# Patient Record
Sex: MALE | Race: WHITE | NOT HISPANIC OR LATINO
[De-identification: names, ages, dates, MRNs, and addresses within clinical notes are randomized per-mention and may not be internally consistent; named-entity substitution may affect disease eponyms.]

---

## 2018-11-09 PROBLEM — Z00.00 ENCOUNTER FOR PREVENTIVE HEALTH EXAMINATION: Status: ACTIVE | Noted: 2018-11-09

## 2018-11-28 ENCOUNTER — APPOINTMENT (OUTPATIENT)
Dept: PULMONOLOGY | Facility: CLINIC | Age: 63
End: 2018-11-28

## 2018-11-28 ENCOUNTER — APPOINTMENT (OUTPATIENT)
Dept: PODIATRY | Facility: CLINIC | Age: 63
End: 2018-11-28
Payer: MEDICARE

## 2018-11-28 VITALS
WEIGHT: 235 LBS | SYSTOLIC BLOOD PRESSURE: 130 MMHG | HEIGHT: 70 IN | DIASTOLIC BLOOD PRESSURE: 70 MMHG | BODY MASS INDEX: 33.64 KG/M2

## 2018-11-28 DIAGNOSIS — F17.200 NICOTINE DEPENDENCE, UNSPECIFIED, UNCOMPLICATED: ICD-10-CM

## 2018-11-28 PROCEDURE — 11057 PARNG/CUTG B9 HYPRKR LES >4: CPT | Mod: Q8

## 2018-11-28 PROCEDURE — 11721 DEBRIDE NAIL 6 OR MORE: CPT | Mod: 59

## 2018-11-28 RX ORDER — CLOPIDOGREL BISULFATE 75 MG/1
75 TABLET, FILM COATED ORAL
Refills: 0 | Status: ACTIVE | COMMUNITY

## 2018-11-28 RX ORDER — ASPIRIN ENTERIC COATED TABLETS 81 MG 81 MG/1
81 TABLET, DELAYED RELEASE ORAL
Refills: 0 | Status: ACTIVE | COMMUNITY

## 2018-11-28 RX ORDER — ISOSORBIDE MONONITRATE 30 MG/1
30 TABLET, EXTENDED RELEASE ORAL
Refills: 0 | Status: ACTIVE | COMMUNITY

## 2019-02-18 ENCOUNTER — APPOINTMENT (OUTPATIENT)
Dept: PODIATRY | Facility: CLINIC | Age: 64
End: 2019-02-18
Payer: MEDICARE

## 2019-02-18 VITALS
DIASTOLIC BLOOD PRESSURE: 78 MMHG | SYSTOLIC BLOOD PRESSURE: 130 MMHG | BODY MASS INDEX: 33.64 KG/M2 | WEIGHT: 235 LBS | HEIGHT: 70 IN

## 2019-02-18 PROCEDURE — 99212 OFFICE O/P EST SF 10 MIN: CPT | Mod: 25

## 2019-02-18 PROCEDURE — 11055 PARING/CUTG B9 HYPRKER LES 1: CPT | Mod: Q8

## 2019-02-18 PROCEDURE — 11721 DEBRIDE NAIL 6 OR MORE: CPT | Mod: 59

## 2019-02-18 NOTE — HISTORY OF PRESENT ILLNESS
[FreeTextEntry1] : patient presents for longstanding chronic care of both feet. He has long thick and painful nails both feet, thickened and painful callus sub 5 right 2/2 1st ray resection right causing unstable gait. Patient requires periodic professional care and gets good relief.

## 2019-02-18 NOTE — PHYSICAL EXAM
[Right Foot Was Examined] : The right foot was examined [Left Foot Was Examined] : The left foot was examined [Delayed in the Right Toes] : delayed in the toes [Delayed in the Left Toes] : delayed in the toes [0] : 0 in the posterior tibialis [1+] : 1+ in the dorsalis pedis [Vibration Dec.] : diminished vibratory sensation at the level of the toes [Position Sense Dec.] : diminished position sense at the level of the toes [Diminished Throughout Right Foot] : diminished tactile sensation with monofilament testing throughout right foot [Diminished Throughout Left Foot] : diminished tactile sensation with monofilament testing throughout left foot [Nails Ingrowing Foot Bilateral] : ingrown [___] : [unfilled] [Nails Ingrowing Foot Left First Toe] : ingrown [Tenderness On Palpation First Toe Left Nail Bed] : tender [Swelling] : not swollen [Tenderness] : not tender [Erythema] : not erythematous [FreeTextEntry1] : Deep Tyloma beneath the fifth metatarsal head right foot [FreeTextEntry2] : s/p 1st ray resection right [] : not elongated [Toenails Thickening] : not hypertrophied [Toenails Discoloration] : normal colored [Nails Ingrowing Foot Right First Toe] : not ingrown [Nails Subungual Debris] : without subungal debris [Tenderness On Palpation First Toe Right Nail Bed] : nontender

## 2019-02-18 NOTE — PROCEDURE
[FreeTextEntry1] : Pulses and associated findings were examined at prior visits and vascular exam reveals evidence of PAD, It has been determined that the patient qualifies for at risk foot care, Patient presents for follow up care of long thick and painful nails to both feet which cause pain, especially in shoe gear and upon ambulation, The patient has been advised to seek professional foot care in the presence of PAD.  \par Mycotic nails all 9 nails exhibit characteristics of onychomycosis including but not limited to thickening incurvation subungual debris partial onycholysis and they are brittle and crumbly when cut and the patient complains of pain in the corners of several nails given they're incurvated status.\par          Hyperkeratosis noted as follows: sub 5 right\par         Signs of abuse/neglect? no.  \par         Fall risk? no.  \par         Skin Exam overall evaluation of the skin fails to reveal any breaks in the skin, erythema, ulcerations, web spaces are clear there is no sign of subcutaneous nodules skin lesions ulcers or other sign of infectious process, dry, peeling and cracked skin to the bottom of both feet.  \par \par \par Using sterile instrumentation debridement of all nails manually and electrically to decrease thickness, pain and girth and make shoe gear more comfortable with "slant back" procedure of any bordering spicules causing pain\par \par Shaving and padding of a benign hyperkeratotic lesion\par

## 2019-04-02 ENCOUNTER — APPOINTMENT (OUTPATIENT)
Dept: PODIATRY | Facility: CLINIC | Age: 64
End: 2019-04-02
Payer: MEDICARE

## 2019-04-02 VITALS
BODY MASS INDEX: 33.5 KG/M2 | HEIGHT: 70 IN | WEIGHT: 234 LBS | DIASTOLIC BLOOD PRESSURE: 80 MMHG | SYSTOLIC BLOOD PRESSURE: 140 MMHG

## 2019-04-02 DIAGNOSIS — L84 CORNS AND CALLOSITIES: ICD-10-CM

## 2019-04-02 PROCEDURE — 99213 OFFICE O/P EST LOW 20 MIN: CPT | Mod: 25

## 2019-04-02 PROCEDURE — 11055 PARING/CUTG B9 HYPRKER LES 1: CPT | Mod: Q8

## 2019-04-02 NOTE — PHYSICAL EXAM
[General Appearance - Alert] : alert [General Appearance - In No Acute Distress] : in no acute distress [FreeTextEntry1] : diabetic polyneuropathy [Oriented To Time, Place, And Person] : oriented to person, place, and time [Impaired Insight] : insight and judgment were intact [Affect] : the affect was normal

## 2019-04-02 NOTE — HISTORY OF PRESENT ILLNESS
[FreeTextEntry1] : Location: plantar/plantar lateral aspect of the right foot\par Duration: about 2 weeks\par Acute: yes\par Patient started experiencing a painful sensation to the bottom outside of the right foot that raised a red flag to the patient that he does suffer from diabetic polyneuropathy

## 2019-04-02 NOTE — PROCEDURE
[FreeTextEntry1] : After debridement of a significant hyperkeratotic lesion to the bottom of the right foot and evacuation of dried blood there was no significant ulcer \par A lengthy discussion with the patient regarding the diagnosis etiology conservative treatment as well as surgical treatments for the diagnosis. I advised the patient that conservative therapy is always preferred to surgical intervention however when conservative therapy fails it is time to consider surgical intervention. I explained how this current condition is treated surgically. I explained anticipated surgical procedure, anticipated postoperative course, the need for strict postoperative compliance regarding dressing changes, any assistive devices needed postoperatively and the importance of coming for scheduled surgical visits afterwards. Risks alternatives and benefits to surgical procedures were explained to the patient in great detail. I used bone models barefoot diagrams as well as and erasable board to explain etiology and treatment options should surgery be the ultimate decision. Again the risks and benefits reviewed all questions asked and answered appropriately\par

## 2019-04-09 ENCOUNTER — APPOINTMENT (OUTPATIENT)
Dept: PODIATRY | Facility: CLINIC | Age: 64
End: 2019-04-09
Payer: MEDICARE

## 2019-04-09 VITALS
BODY MASS INDEX: 33.5 KG/M2 | HEIGHT: 70 IN | WEIGHT: 234 LBS | SYSTOLIC BLOOD PRESSURE: 124 MMHG | DIASTOLIC BLOOD PRESSURE: 80 MMHG

## 2019-04-09 PROCEDURE — 99213 OFFICE O/P EST LOW 20 MIN: CPT

## 2019-04-09 RX ORDER — LINAGLIPTIN AND METFORMIN HYDROCHLORIDE 2.5; 1 MG/1; MG/1
2.5-1 TABLET, FILM COATED ORAL
Refills: 0 | Status: DISCONTINUED | COMMUNITY
End: 2019-04-09

## 2019-04-09 NOTE — PROCEDURE
[FreeTextEntry1] : I have reviewed the care orders with the patient they understand and they also understands the long-term consequences of not following my wound care orders as well  precautions I have recommended  and shoe gear advice I have mentioned. Betadine daily for 5 days

## 2019-04-09 NOTE — HISTORY OF PRESENT ILLNESS
[FreeTextEntry1] : Location: plantar/plantar lateral aspect of the right foot\par Duration: about 3 weeks\par Acute: yes\par Has been offea with a diabetic surgically shoe as well as using Bactroban daily

## 2019-04-09 NOTE — PHYSICAL EXAM
[General Appearance - In No Acute Distress] : in no acute distress [General Appearance - Alert] : alert [FreeTextEntry1] : peripheral neuropathy

## 2019-04-30 ENCOUNTER — APPOINTMENT (OUTPATIENT)
Dept: PODIATRY | Facility: CLINIC | Age: 64
End: 2019-04-30
Payer: MEDICARE

## 2019-04-30 VITALS
BODY MASS INDEX: 33.5 KG/M2 | WEIGHT: 234 LBS | SYSTOLIC BLOOD PRESSURE: 126 MMHG | HEIGHT: 70 IN | DIASTOLIC BLOOD PRESSURE: 70 MMHG

## 2019-04-30 PROCEDURE — 11721 DEBRIDE NAIL 6 OR MORE: CPT | Mod: 59

## 2019-04-30 PROCEDURE — 99212 OFFICE O/P EST SF 10 MIN: CPT

## 2019-04-30 PROCEDURE — 11056 PARNG/CUTG B9 HYPRKR LES 2-4: CPT | Mod: Q8

## 2019-04-30 RX ORDER — CEPHALEXIN 500 MG/1
500 CAPSULE ORAL 3 TIMES DAILY
Qty: 21 | Refills: 1 | Status: DISCONTINUED | COMMUNITY
Start: 2019-04-03 | End: 2019-04-30

## 2019-04-30 NOTE — PHYSICAL EXAM
[Right Foot Was Examined] : The right foot was examined [Left Foot Was Examined] : The left foot was examined [Delayed in the Right Toes] : delayed in the toes [Delayed in the Left Toes] : delayed in the toes [0] : 0 in the posterior tibialis [1+] : 1+ in the dorsalis pedis [Vibration Dec.] : diminished vibratory sensation at the level of the toes [Position Sense Dec.] : diminished position sense at the level of the toes [Diminished Throughout Right Foot] : diminished tactile sensation with monofilament testing throughout right foot [Diminished Throughout Left Foot] : diminished tactile sensation with monofilament testing throughout left foot [Swelling] : not swollen [Tenderness] : not tender [Erythema] : not erythematous [FreeTextEntry1] : The patient has all contributing factors to onychomycosis including but not limited to thickness, subungual debris, discoloration and partial lysis and they are brittle when cut.  Painful hyperkeratotic lesions noted sub 5 bilateral, no evidcence of any break in the skin, or ulcerative lesion [FreeTextEntry2] : s/p 1st ray resection right

## 2019-04-30 NOTE — PROCEDURE
[FreeTextEntry1] : The above-named patient presents for followup of diabetic foot care. The patient complains of ongoing thickened and painful nails and states they get relief from periodic debridement under sterile aseptic technique. The patient has no other presenting complaints. Therefore, under sharp sterile aseptic technique sharp debridement of all nails was performed with removal of any offending painful spicules. This was accomplished in order to make the toes and feet more comfortable to ambulate in conventional shoe gear. This was followed by electric burring of irregular and rough edges. In addition to the debridement of nails the patient had a lengthy discussion regarding overall skin care and discussion regarding the type of shoes they should be wearing. At the conclusion of the visit the patient stated they understood and all questions asked and answered appropriately and the patient stated relief of symptoms\par Using sterile instrumentation debridement of all nails manually and electrically to decrease thickness, pain and girth and make shoe gear more comfortable with "slant back" procedure of any bordering spicules causing pain\par Utilizing a scalpel and sterile aseptic technique sharp debridement of multiple hyperkeratotic lesions performed. Appropriate padding were necessary.\par

## 2019-05-20 ENCOUNTER — RESULT REVIEW (OUTPATIENT)
Age: 64
End: 2019-05-20

## 2019-05-20 ENCOUNTER — APPOINTMENT (OUTPATIENT)
Dept: RADIATION ONCOLOGY | Facility: CLINIC | Age: 64
End: 2019-05-20
Payer: MEDICARE

## 2019-05-20 ENCOUNTER — APPOINTMENT (OUTPATIENT)
Dept: HEMATOLOGY ONCOLOGY | Facility: CLINIC | Age: 64
End: 2019-05-20
Payer: MEDICARE

## 2019-05-20 VITALS
TEMPERATURE: 98 F | DIASTOLIC BLOOD PRESSURE: 73 MMHG | OXYGEN SATURATION: 100 % | RESPIRATION RATE: 20 BRPM | BODY MASS INDEX: 33.3 KG/M2 | SYSTOLIC BLOOD PRESSURE: 130 MMHG | WEIGHT: 230 LBS | HEART RATE: 60 BPM | HEIGHT: 69.69 IN

## 2019-05-20 VITALS
TEMPERATURE: 98.3 F | RESPIRATION RATE: 20 BRPM | HEART RATE: 62 BPM | BODY MASS INDEX: 34.51 KG/M2 | HEIGHT: 69 IN | WEIGHT: 233 LBS | OXYGEN SATURATION: 100 % | DIASTOLIC BLOOD PRESSURE: 70 MMHG | SYSTOLIC BLOOD PRESSURE: 129 MMHG

## 2019-05-20 PROCEDURE — 92511 NASOPHARYNGOSCOPY: CPT

## 2019-05-20 PROCEDURE — 99205 OFFICE O/P NEW HI 60 MIN: CPT | Mod: 25

## 2019-05-20 PROCEDURE — 99205 OFFICE O/P NEW HI 60 MIN: CPT

## 2019-05-20 NOTE — PHYSICAL EXAM
[Fully active, able to carry on all pre-disease performance without restriction] : Status 0 - Fully active, able to carry on all pre-disease performance without restriction [Normal] : affect appropriate [de-identified] : left posterior palate - induration together with left tonsilar infiltration, LEft cervical adenopathy

## 2019-05-20 NOTE — ASSESSMENT
[FreeTextEntry1] : 64 yo male referred by Dr. YING Kaur for evaluation of newly diagnosed tongue cancer.  \par Patient underwent endoscopy and CT scan of soft tissue neck which revealed left posterior aspect ulcerative lesion on the tongue confluent and infiltrating left tonsil. Patient has palpable left cervical adenopathy. \par \par We discussed current findings and indications for biopsy and staging with PETCT.  \par Patient will stop plavix and Asprin prior to biopsy with IR.\par He is a diabetic and has h/o peripheral neuropathy thus he is not a candidate for cisplatin.  Offered carbo/ 5- FU protocol: \par Days 1–4: 5-FU 600mg/m2/day as continuous IV infusion for 4 days + carboplatin 70mg/m2/day IV bolus.\par \par Repeat every 3 weeks for 3 cycles (given concurrently with radiotherapy).\par \par Will finalize the treatment plan after biopsy and staging. \par

## 2019-05-20 NOTE — CONSULT LETTER
[Dear  ___] : Dear  [unfilled], [Consult Letter:] : I had the pleasure of evaluating your patient, [unfilled]. [Please see my note below.] : Please see my note below. [Consult Closing:] : Thank you very much for allowing me to participate in the care of this patient.  If you have any questions, please do not hesitate to contact me. [Sincerely,] : Sincerely, [FreeTextEntry3] : Ana Velasquez MD\par [facility] [place] [facility]\par  [DrEvie  ___] : Dr. CHOWDARY [DrEvie ___] : Dr. CHOWDARY

## 2019-05-20 NOTE — HISTORY OF PRESENT ILLNESS
[de-identified] : 63 year old male presents today for new diagnosis of head and neck cancer, referred by Dr. Kaur, ENT.  Mr. Camacho was sent to ENT for a left neck mass with associated left tongue base.  He reported a 3 month history of constant, moderate progressive left sided throat pain with radiating otalgia.  Incidentally it was noted on neck US with vascular surgeon to have a left neck mass.  He has a long standing history of cigarette smoking 5- 6 cigarettes per day and uses alcohol in past however has quit x 5 years.  CT scan done 5/16/19 showed a 5.5cm multiloculated solid and cystic mass within the left latera; neck soft tissues.  Findings may reflect primary neoplasm versus conglomerate of left level 3 cystic/necrotic lymphadenopathy.  Additional abnormal left level 2-4 lymphadenopathy.  Nonspecific prominence of the left lingual tonsils with enhancing soft tissue involving the left oropharyngeal lateral wall.  Regional effacement of fat planes laterally involving the left submandibular gland and posteriorly involving the carotid sheath.  Mild asymmetric enlargement of the left submandibular gland.  PET CT is recommend.  He has not had a biopsy.  He has complaints of a sore throat, with left ear pain at times and new onset of headaches.  \par \par Patient was adopted so he does not know his family history

## 2019-05-21 NOTE — VITALS
[Maximal Pain Intensity: 0/10] : 0/10 [Least Pain Intensity: 0/10] : 0/10 [Date: ____________] : Patient's last distress assessment performed on [unfilled]. [7 - Distress Level] : Distress Level: 7 [Referred Patient  to social work for follow-up] : Patient was referred to social work for follow-up [Patient given social work contact information and resource sheet] : Patient was given social work contact information and resource sheet [FreeTextEntry7] : Transportation

## 2019-05-21 NOTE — LETTER CLOSING
[Consult Closing:] : Thank you for allowing me to participate in the care of this patient.  If you have any questions, please do not hesitate to contact me. [Sincerely yours,] : Sincerely yours, [FreeTextEntry3] : Ally Thapa MD

## 2019-05-21 NOTE — PROCEDURE
[Hoarseness] : hoarseness not clearly evaluated by indirect laryngoscopy [Topical Lidocaine] : topical lidocaine [Flexible Endoscope] : examined with the flexible endoscope [Photographs Taken] : photographs taken [Normal] : the true vocal cords ~T were normal [de-identified] : Area of fullness along the left base of tongue [de-identified] : Edematous

## 2019-05-21 NOTE — PHYSICAL EXAM
[Normal] : no focal deficits [de-identified] : left neck lymphadenopathy [de-identified] : able to plapate left cervical adenopathy and fullness

## 2019-05-21 NOTE — HISTORY OF PRESENT ILLNESS
[FreeTextEntry1] : Mr. Camacho is a 63 year old male, who was recent found to have a left tongue base mass with left neck lymphadenopathy referred by Dr. Kaur to discuss management options with radiation therapy.    \par \par On 4/25/19, while undergoing a carotid ultrasound he was found to have multiple large complex masses in the left neck (largest measuring 4.3 cm).  Dr. Gold then referred Mr. Camacho to ENT for further evaluation. Laryngoscopy was performed (5/7/19), which revealed a mucosal lesion in the pharynx and a possible left tongue mass. Over the past 2 months, he reports occasional cough, left sided sore throat, left otalgia and more recently headaches. He rates his pain/discomfort 7 /10 and prefers not to try any pain medication at this time. He is an active smoker (1 ppd x 50 years, currently down to 5 cigarettes/ day) and has a history alcohol abuse with sobriety for 5 years. CT Neck w/ contrast (5/16/19) demonstrated a 5.5 cm solid and cystic mass within the left lateral neck with abnormal left level 2 through 4 lymphadenopathy. Biopsy of the left neck adenopathy is pending scheduling and PET/CT is scheduled for 5/23/19. Mr. Camacho  presents today to discuss radiation therapy in the management of his diagnosis. \par

## 2019-05-21 NOTE — DISEASE MANAGEMENT
[Clinical] : TNM Stage: c [N/A] : Currently not applicable [FreeTextEntry4] : Oropharyngeal mass [TTNM] : X [NTNM] : X [MTNM] : X

## 2019-05-21 NOTE — PROCEDURE
[Hoarseness] : hoarseness not clearly evaluated by indirect laryngoscopy [Topical Lidocaine] : topical lidocaine [Flexible Endoscope] : examined with the flexible endoscope [Photographs Taken] : photographs taken [Normal] : the true vocal cords ~T were normal [de-identified] : Area of fullness along the left base of tongue [de-identified] : Edematous

## 2019-05-21 NOTE — PHYSICAL EXAM
[Normal] : no focal deficits [de-identified] : left neck lymphadenopathy [de-identified] : able to plapate left cervical adenopathy and fullness

## 2019-05-28 ENCOUNTER — RESULT REVIEW (OUTPATIENT)
Age: 64
End: 2019-05-28

## 2019-05-29 ENCOUNTER — RESULT REVIEW (OUTPATIENT)
Age: 64
End: 2019-05-29

## 2019-06-11 ENCOUNTER — RESULT REVIEW (OUTPATIENT)
Age: 64
End: 2019-06-11

## 2019-06-12 ENCOUNTER — RESULT REVIEW (OUTPATIENT)
Age: 64
End: 2019-06-12

## 2019-07-08 ENCOUNTER — APPOINTMENT (OUTPATIENT)
Dept: PODIATRY | Facility: CLINIC | Age: 64
End: 2019-07-08
Payer: MEDICARE

## 2019-07-08 VITALS
SYSTOLIC BLOOD PRESSURE: 124 MMHG | HEIGHT: 69 IN | WEIGHT: 233 LBS | BODY MASS INDEX: 34.51 KG/M2 | DIASTOLIC BLOOD PRESSURE: 80 MMHG

## 2019-07-08 PROCEDURE — 99213 OFFICE O/P EST LOW 20 MIN: CPT | Mod: 25

## 2019-07-08 PROCEDURE — 11042 DBRDMT SUBQ TIS 1ST 20SQCM/<: CPT | Mod: LT

## 2019-07-08 PROCEDURE — 11056 PARNG/CUTG B9 HYPRKR LES 2-4: CPT | Mod: Q8,59

## 2019-07-08 PROCEDURE — 11721 DEBRIDE NAIL 6 OR MORE: CPT | Mod: 59

## 2019-07-08 NOTE — REASON FOR VISIT
[Follow-Up] : a follow-up visit [FreeTextEntry1] : diabetic foot care and new c/o open sore 5th toe left foot

## 2019-07-08 NOTE — PROCEDURE
[FreeTextEntry1] : Using sterile instrumentation debridement of all nails manually and electrically to decrease thickness, pain and girth and make shoe gear more comfortable with "slant back" procedure of any bordering spicules causing pain\par Utilizing a scalpel and sterile aseptic technique sharp debridement of multiple hyperkeratotic lesions performed. Appropriate padding were necessary.\par \par Evaluation of the lesser left toe is 0.3 x 0.2 0.2 cm no sign of infection no erythema no discharge or drainage completely filled with fibrin\par a sharp full thickness excisional debridement utilizing a scalpel tissue nipper as well as a curette into the subcutaneous tissue level was performed. Bleeding was mild and was controlled with pressure. , discharge orders as well as wound care orders were reviewed with the patient and a follow up appointment given, Bactroban to be applied on a daily basis\par

## 2019-07-08 NOTE — HISTORY OF PRESENT ILLNESS
[FreeTextEntry1] : The patient presents for follow up of chronic and painful mycotic nail disease as well as painful kyperkeratoses. Past professional  tx's in the past in the presence of PAD have consisted of periodic debridements of both nails and painful keratoses which have offered significant relief in controlling of symptoms and the patient presents for follow up care.\par Through separate identifiable small laceration to the medial aspect of the left fifth toe history of present illness unknown etiology unknown no prior treatment rendered

## 2019-07-08 NOTE — PHYSICAL EXAM
[General Appearance - Alert] : alert [General Appearance - In No Acute Distress] : in no acute distress [Right Foot Was Examined] : The right foot was examined [Left Foot Was Examined] : The left foot was examined [Delayed in the Right Toes] : delayed in the toes [Delayed in the Left Toes] : delayed in the toes [0] : 0 in the posterior tibialis [Vibration Dec.] : diminished vibratory sensation at the level of the toes [1+] : 1+ in the dorsalis pedis [Position Sense Dec.] : diminished position sense at the level of the toes [Diminished Throughout Right Foot] : diminished tactile sensation with monofilament testing throughout right foot [Diminished Throughout Left Foot] : diminished tactile sensation with monofilament testing throughout left foot [Impaired Insight] : insight and judgment were intact [Oriented To Time, Place, And Person] : oriented to person, place, and time [Affect] : the affect was normal [Tenderness] : not tender [Swelling] : not swollen [Erythema] : not erythematous [FreeTextEntry2] : s/p 1st ray resection right [FreeTextEntry1] : diabetic neuropathy noted

## 2019-07-08 NOTE — REVIEW OF SYSTEMS
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

## 2019-07-16 ENCOUNTER — APPOINTMENT (OUTPATIENT)
Dept: PODIATRY | Facility: CLINIC | Age: 64
End: 2019-07-16
Payer: MEDICARE

## 2019-07-16 VITALS
SYSTOLIC BLOOD PRESSURE: 126 MMHG | HEIGHT: 69 IN | BODY MASS INDEX: 34.51 KG/M2 | DIASTOLIC BLOOD PRESSURE: 80 MMHG | WEIGHT: 233 LBS

## 2019-07-16 PROCEDURE — 99213 OFFICE O/P EST LOW 20 MIN: CPT

## 2019-07-16 NOTE — PHYSICAL EXAM
[General Appearance - Alert] : alert [General Appearance - In No Acute Distress] : in no acute distress [Right Foot Was Examined] : The right foot was examined [Left Foot Was Examined] : The left foot was examined [Delayed in the Right Toes] : delayed in the toes [Delayed in the Left Toes] : delayed in the toes [0] : 0 in the posterior tibialis [1+] : 1+ in the dorsalis pedis [Vibration Dec.] : diminished vibratory sensation at the level of the toes [Position Sense Dec.] : diminished position sense at the level of the toes [Diminished Throughout Right Foot] : diminished tactile sensation with monofilament testing throughout right foot [Diminished Throughout Left Foot] : diminished tactile sensation with monofilament testing throughout left foot [Oriented To Time, Place, And Person] : oriented to person, place, and time [Impaired Insight] : insight and judgment were intact [Affect] : the affect was normal [Swelling] : not swollen [Tenderness] : not tender [Erythema] : not erythematous [FreeTextEntry2] : s/p 1st ray resection right [FreeTextEntry1] : diabetic polyneuropathy

## 2019-07-16 NOTE — HISTORY OF PRESENT ILLNESS
[FreeTextEntry1] :  medial aspect of the left fifth toe history of present illness unknown etiology

## 2019-07-16 NOTE — PROCEDURE
[FreeTextEntry1] : I have had a lengthy discussion with the patient regarding overall skincare. The importance of the type of socks, the type of shoes, and the type of overall foot hygiene is important to help control and prevent eruptions especially over extreme weather changes. This included but was not limited to hydration and lubrication, dove soap, triple rinse clothing and linens. I also explained the importance of thorough drying of both feet especially the web spaces. Given the extreme temperatures back in a car I also reviewed the type of shoes that would help reduce the chances of cracking of the skin especially leading to fissuring of the heels. Overall skincare precautions were reviewed education literature dispensed in the patient's questions asked and answered appropriately.\par

## 2019-08-05 ENCOUNTER — APPOINTMENT (OUTPATIENT)
Dept: HEMATOLOGY ONCOLOGY | Facility: CLINIC | Age: 64
End: 2019-08-05

## 2019-08-20 ENCOUNTER — APPOINTMENT (OUTPATIENT)
Dept: PODIATRY | Facility: CLINIC | Age: 64
End: 2019-08-20
Payer: MEDICARE

## 2019-08-20 VITALS
BODY MASS INDEX: 34.51 KG/M2 | WEIGHT: 233 LBS | DIASTOLIC BLOOD PRESSURE: 70 MMHG | HEIGHT: 69 IN | SYSTOLIC BLOOD PRESSURE: 120 MMHG

## 2019-08-20 PROCEDURE — 11056 PARNG/CUTG B9 HYPRKR LES 2-4: CPT | Mod: Q8

## 2019-08-20 PROCEDURE — 11721 DEBRIDE NAIL 6 OR MORE: CPT | Mod: 59

## 2019-08-20 NOTE — PROCEDURE
[FreeTextEntry1] : Using sterile instrumentation debridement of all nails manually and electrically to decrease thickness, pain and girth and make shoe gear more comfortable with "slant back" procedure of any bordering spicules causing pain\par \par Utilizing a scalpel and sterile aseptic technique sharp debridement of multiple hyperkeratotic lesions performed. Appropriate padding were necessary.\par \par

## 2019-08-20 NOTE — PHYSICAL EXAM
[General Appearance - Alert] : alert [General Appearance - In No Acute Distress] : in no acute distress [Left Foot Was Examined] : The left foot was examined [Right Foot Was Examined] : The right foot was examined [Delayed in the Right Toes] : delayed in the toes [0] : 0 in the posterior tibialis [Delayed in the Left Toes] : delayed in the toes [1+] : 1+ in the dorsalis pedis [Vibration Dec.] : diminished vibratory sensation at the level of the toes [Position Sense Dec.] : diminished position sense at the level of the toes [Diminished Throughout Right Foot] : diminished tactile sensation with monofilament testing throughout right foot [Diminished Throughout Left Foot] : diminished tactile sensation with monofilament testing throughout left foot [Swelling] : not swollen [Tenderness] : not tender [Erythema] : not erythematous [FreeTextEntry2] : s/p 1st ray resection right [FreeTextEntry1] : diabetic polyneuropathy

## 2019-08-20 NOTE — HISTORY OF PRESENT ILLNESS
[FreeTextEntry1] : The patient presents for follow up of chronic and painful mycotic nail disease as well as painful kyperkeratoses. Past professional  tx's in the past in the presence of PAD have consisted of periodic debridements of both nails and painful keratoses which have offered significant relief in controlling of symptoms and the patient presents for follow up care.\par

## 2019-08-28 NOTE — REASON FOR VISIT
[Other: _________] : [unfilled] [Head and Neck Cancer] : head and neck cancer [Family Member] : family member

## 2019-08-29 ENCOUNTER — APPOINTMENT (OUTPATIENT)
Dept: RADIATION ONCOLOGY | Facility: CLINIC | Age: 64
End: 2019-08-29
Payer: MEDICARE

## 2019-08-29 VITALS — WEIGHT: 233 LBS | BODY MASS INDEX: 34.51 KG/M2 | HEIGHT: 69 IN

## 2019-08-29 PROCEDURE — 99213 OFFICE O/P EST LOW 20 MIN: CPT

## 2019-08-29 NOTE — HISTORY OF PRESENT ILLNESS
[FreeTextEntry1] : Mr. 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  He  was referred by Dr. Kaur after  a carotid ultrasound on 4/25/19 revealed multiple large complex masses in the left neck (largest measuring 4.3 cm). Dr. Gold then referred Mr. Camacho to ENT for further evaluation. Laryngoscopy was performed (5/7/19), which revealed a mucosal lesion in the pharynx and a possible left tongue mass. He is an active smoker (1 ppd x 50 years, currently down to 5 cigarettes/ day) and has a history alcohol abuse with sobriety for 5 years. \par \par CT Neck w/ contrast (5/16/19) demonstrated a 5.5 cm solid and cystic mass within the left lateral neck with abnormal left level 2 through 4 lymphadenopathy. \par \par 5/29/19 - FNA of left neck mass revealed suspicious findings for squamous cell carcinoma and post aspiration there was complete resolution of the lesion. \par \par 6/12/19 - 1. Left neck biopsy was performed and pathology revealed at least squamous cell carcinoma in situ. 2. Left neck FNA was again suspicious for squamous cell carcinoma. \par \par PET/CT (5/23/19) demonstrated metastatic cancer of the base of the tongue measuring 2.1 cm (SUV 13.96) with bilateral cervical adenopathy (largest on the right 2.5 cm and on the left 1.3 cm).   \par \par Repeat biopsy on 6/24/19 revealed extensive squamous cell carcinoma in site with focal invasion, p16+. 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 \par \par He underwent CT simulation for radiation planning on 7/23/19. Due to many concerns regarding treatment plan with concurrent chemotherapy, he requested to postpone his treatment start date, which was on 8/6/19. He presents today with his sister (Liliya) to further discuss his treatment plan with radiation therapy. \par

## 2019-08-29 NOTE — DISEASE MANAGEMENT
[Clinical] : TNM Stage: c [FreeTextEntry4] : Squamous cell carcinoma of the left base of tongue [TTNM] : 2 [NTNM] : 2 [II] : II [MTNM] : 0

## 2019-09-10 VITALS
SYSTOLIC BLOOD PRESSURE: 162 MMHG | BODY MASS INDEX: 34.36 KG/M2 | RESPIRATION RATE: 18 BRPM | DIASTOLIC BLOOD PRESSURE: 62 MMHG | HEART RATE: 61 BPM | OXYGEN SATURATION: 100 % | HEIGHT: 69 IN | WEIGHT: 232 LBS

## 2019-09-10 NOTE — DISEASE MANAGEMENT
[Clinical] : TNM Stage: c [II] : II [NTNM] : 2 [TTNM] : 2 [MTNM] : 0 [de-identified] : 7000 cGy [de-identified] : 1200 cGy [de-identified] : BOT/Neck

## 2019-09-10 NOTE — HISTORY OF PRESENT ILLNESS
[FreeTextEntry1] : Mr. Camacho is present today for OTV. He reports increasing dysgeusia and mild throat discomfort.  His appetite is okay for now and he is maintaining his weight at 232 lbs. He is otherwise doing well. He is not interested in meeting with a nutritionist at this time.

## 2019-09-10 NOTE — PHYSICAL EXAM
[Normal] : oriented to person, place and time, the affect was normal, the mood was normal and not anxious [de-identified] : faint erythema

## 2019-09-10 NOTE — REVIEW OF SYSTEMS
[Dysgeusia: Grade 1- Altered taste but no change in diet] : Dysgeusia: Grade 1 - Altered taste but no change in diet [Dermatitis Radiation: Grade 1 - Faint erythema or dry desquamation] : Dermatitis Radiation: Grade 1 - Faint erythema or dry desquamation

## 2019-09-17 VITALS
HEART RATE: 60 BPM | SYSTOLIC BLOOD PRESSURE: 130 MMHG | WEIGHT: 232 LBS | RESPIRATION RATE: 18 BRPM | HEIGHT: 69 IN | DIASTOLIC BLOOD PRESSURE: 80 MMHG | TEMPERATURE: 98.2 F | BODY MASS INDEX: 34.36 KG/M2

## 2019-09-17 NOTE — PHYSICAL EXAM
[Examination Of The Oral Cavity] : the lips and gums were normal [Normal Oral Cavity] : oral cavity was normal [Normal] : the ears, nose and oropharynx were normal in appearance [de-identified] : faint erythema

## 2019-09-17 NOTE — REVIEW OF SYSTEMS
[Dysgeusia: Grade 1- Altered taste but no change in diet] : Dysgeusia: Grade 1 - Altered taste but no change in diet [Dermatitis Radiation: Grade 1 - Faint erythema or dry desquamation] : Dermatitis Radiation: Grade 1 - Faint erythema or dry desquamation [Oral Pain: Grade 2 - Moderate pain; limiting instrumental ADL] : Oral Pain: Grade 2 - Moderate pain; limiting instrumental ADL

## 2019-09-17 NOTE — HISTORY OF PRESENT ILLNESS
[FreeTextEntry1] : Mr. Camacho presents today for OTV. He offers complaints of throat and gum discomfort. As a result his intake is less and his blood sugars have been low. Referred to covering dietician yesterday, who provided him with recommendations. He is also with complaint of coughing at night and increased mucous possibly due to allergy exacerbation.

## 2019-09-17 NOTE — DISEASE MANAGEMENT
[Clinical] : TNM Stage: c [II] : II [TTNM] : 2 [NTNM] : 2 [MTNM] : 0 [de-identified] : 2200 cGy [de-identified] : 7000 cGy [de-identified] : BOT/Neck

## 2019-09-30 NOTE — REVIEW OF SYSTEMS
[Dysgeusia: Grade 1- Altered taste but no change in diet] : Dysgeusia: Grade 1 - Altered taste but no change in diet [Oral Pain: Grade 2 - Moderate pain; limiting instrumental ADL] : Oral Pain: Grade 2 - Moderate pain; limiting instrumental ADL [Dermatitis Radiation: Grade 1 - Faint erythema or dry desquamation] : Dermatitis Radiation: Grade 1 - Faint erythema or dry desquamation

## 2019-10-01 VITALS
HEIGHT: 69 IN | BODY MASS INDEX: 31.55 KG/M2 | HEART RATE: 59 BPM | TEMPERATURE: 97.9 F | RESPIRATION RATE: 18 BRPM | DIASTOLIC BLOOD PRESSURE: 65 MMHG | OXYGEN SATURATION: 100 % | SYSTOLIC BLOOD PRESSURE: 122 MMHG | WEIGHT: 213 LBS

## 2019-10-01 NOTE — HISTORY OF PRESENT ILLNESS
[FreeTextEntry1] : Mr. Camacho is status post fraction 15 / 35. He has refused treatment for the past week secondary to radiation side effects. Specifically, throat pain/discomfort, oral pain, and loss of taste. He has lost 19 pounds in 1 week, weighing 213 pounds today. He reports he is feeling better, however he would like to discontinue treatment. He has refused to take pain medication due to his history of alcohol abuse. He expressed concern that the radiation was wearing him down and he lives alone without support. He expressed the desire to discontinue his treatments and understands that he did not receive the full definitive dose of radiation.

## 2019-10-01 NOTE — PHYSICAL EXAM
[Normal] : the ears, nose and oropharynx were normal in appearance [de-identified] : mild hyperpigmentation [de-identified] : cervical lymphadenopathy improved [de-identified] : mild erythema

## 2019-10-01 NOTE — DISEASE MANAGEMENT
[Clinical] : TNM Stage: c [II] : II [TTNM] : 2 [NTNM] : 2 [MTNM] : 0 [de-identified] : 3000 cGy [de-identified] : 7000 cGy [de-identified] : BOT/Neck

## 2019-11-11 ENCOUNTER — APPOINTMENT (OUTPATIENT)
Dept: PODIATRY | Facility: CLINIC | Age: 64
End: 2019-11-11
Payer: MEDICARE

## 2019-11-11 VITALS
SYSTOLIC BLOOD PRESSURE: 122 MMHG | DIASTOLIC BLOOD PRESSURE: 80 MMHG | BODY MASS INDEX: 31.55 KG/M2 | HEIGHT: 69 IN | WEIGHT: 213 LBS

## 2019-11-11 PROCEDURE — 99213 OFFICE O/P EST LOW 20 MIN: CPT | Mod: 25

## 2019-11-11 PROCEDURE — 11056 PARNG/CUTG B9 HYPRKR LES 2-4: CPT | Mod: Q8

## 2019-11-11 PROCEDURE — 11721 DEBRIDE NAIL 6 OR MORE: CPT | Mod: 59

## 2019-11-11 NOTE — REASON FOR VISIT
[Follow-Up] : a follow-up visit [FreeTextEntry1] : at risk foot care, also has new c/o ulcer to the back of the right heel 2/2 to a new pair of footwear

## 2019-11-11 NOTE — PHYSICAL EXAM
[General Appearance - Alert] : alert [General Appearance - In No Acute Distress] : in no acute distress [Right Foot Was Examined] : The right foot was examined [Left Foot Was Examined] : The left foot was examined [Delayed in the Right Toes] : delayed in the toes [0] : 0 in the posterior tibialis [Delayed in the Left Toes] : delayed in the toes [1+] : 1+ in the dorsalis pedis [Vibration Dec.] : diminished vibratory sensation at the level of the toes [Diminished Throughout Right Foot] : diminished tactile sensation with monofilament testing throughout right foot [Position Sense Dec.] : diminished position sense at the level of the toes [Diminished Throughout Left Foot] : diminished tactile sensation with monofilament testing throughout left foot [Swelling] : not swollen [Tenderness] : not tender [Erythema] : not erythematous [FreeTextEntry2] : s/p 1st ray resection right [FreeTextEntry1] : diabetic polyneuropathy

## 2019-11-11 NOTE — HISTORY OF PRESENT ILLNESS
[FreeTextEntry1] : The patient presents for follow up of chronic and painful mycotic nail disease as well as painful kyperkeratoses. Past professional  tx's in the past in the presence of PAD have consisted of periodic debridements of both nails and painful keratoses which have offered significant relief in controlling of symptoms and the patient presents for follow up care.\par Left heel\par He states for a couple of weeks before he noticed staining on his socks. That was severasl weeks ago and has been putting on "a cream" (unknown) but can't reach the area or wrap it himself\par

## 2019-11-11 NOTE — PROCEDURE
[FreeTextEntry1] : Description of the ulcer shows the dimensions listed above it is completely filled with fibrin surrounding hyperkeratosis the periwound erythema however there was no tunneling or sinus tract no odor no sign of infectious process at this time\par Using sterile instrumentation debridement of all nails manually and electrically to decrease thickness, pain and girth and make shoe gear more comfortable with "slant back" procedure of any bordering spicules causing pain\par \par Utilizing a scalpel and sterile aseptic technique sharp debridement of multiple hyperkeratotic lesions performed. Appropriate padding were necessary.\par \par

## 2019-11-11 NOTE — REVIEW OF SYSTEMS
[As Noted in HPI] : as noted in HPI [Skin Lesions] : skin lesion [Skin Wound] : skin wound [Negative] : Neurological [Leg Claudication] : no intermittent leg claudication [Lower Ext Edema] : no extremity edema [FreeTextEntry9] : OA bilateral, hx of 1st ray resection right foot

## 2019-11-14 ENCOUNTER — APPOINTMENT (OUTPATIENT)
Dept: RADIATION ONCOLOGY | Facility: CLINIC | Age: 64
End: 2019-11-14

## 2019-11-19 ENCOUNTER — APPOINTMENT (OUTPATIENT)
Dept: PODIATRY | Facility: CLINIC | Age: 64
End: 2019-11-19
Payer: MEDICARE

## 2019-11-19 VITALS
BODY MASS INDEX: 31.55 KG/M2 | WEIGHT: 213 LBS | DIASTOLIC BLOOD PRESSURE: 80 MMHG | SYSTOLIC BLOOD PRESSURE: 124 MMHG | HEIGHT: 69 IN

## 2019-11-19 PROCEDURE — 11042 DBRDMT SUBQ TIS 1ST 20SQCM/<: CPT

## 2019-11-19 NOTE — PROCEDURE
[FreeTextEntry1] : a sharp full thickness excisional debridement utilizing a scalpel tissue nipper as well as a curette into the subcutaneous tissue level was performed. Bleeding was mild and was controlled with pressure. , discharge orders as well as wound care orders were reviewed with the patient and a follow up appointment given, Bactroban to be applied on a daily basis\par

## 2019-11-19 NOTE — HISTORY OF PRESENT ILLNESS
[FreeTextEntry1] : Left heel\par He states for a couple of weeks before he noticed staining on his socks. That was severasl weeks ago and has been putting bactroban recently.\par VNS has been sporatic

## 2019-11-25 ENCOUNTER — RESULT REVIEW (OUTPATIENT)
Age: 64
End: 2019-11-25

## 2019-11-25 ENCOUNTER — APPOINTMENT (OUTPATIENT)
Dept: PODIATRY | Facility: CLINIC | Age: 64
End: 2019-11-25
Payer: MEDICARE

## 2019-11-25 VITALS
WEIGHT: 213 LBS | BODY MASS INDEX: 31.55 KG/M2 | DIASTOLIC BLOOD PRESSURE: 80 MMHG | SYSTOLIC BLOOD PRESSURE: 126 MMHG | HEIGHT: 69 IN

## 2019-11-25 PROCEDURE — 11042 DBRDMT SUBQ TIS 1ST 20SQCM/<: CPT

## 2019-11-25 NOTE — PHYSICAL EXAM
[FreeTextEntry1] : left posterior heel, 0.2 x 0.5 x 0.1 no erythema, to tunnel, no sinus tract no sign of infection

## 2019-12-05 ENCOUNTER — APPOINTMENT (OUTPATIENT)
Dept: PODIATRY | Facility: CLINIC | Age: 64
End: 2019-12-05
Payer: MEDICARE

## 2019-12-05 VITALS
HEIGHT: 69 IN | BODY MASS INDEX: 31.55 KG/M2 | WEIGHT: 213 LBS | DIASTOLIC BLOOD PRESSURE: 80 MMHG | SYSTOLIC BLOOD PRESSURE: 126 MMHG

## 2019-12-05 PROCEDURE — 99213 OFFICE O/P EST LOW 20 MIN: CPT

## 2019-12-05 NOTE — REVIEW OF SYSTEMS
[As Noted in HPI] : as noted in HPI [Negative] : Musculoskeletal [Fever] : no fever [Chills] : no chills [Leg Claudication] : no intermittent leg claudication [Lower Ext Edema] : no extremity edema

## 2019-12-05 NOTE — PROCEDURE
[FreeTextEntry1] : a sharp full thickness excisional debridement utilizing a scalpel tissue nipper as well as a curette into the subcutaneous tissue level was performed. Bleeding was mild and was controlled with pressure. , discharge orders as well as wound care orders were reviewed with the patient and a follow up appointment given, Bactroban to be applied on a daily basis\par 
scheduled chemotherapy

## 2019-12-27 ENCOUNTER — APPOINTMENT (OUTPATIENT)
Dept: PODIATRY | Facility: CLINIC | Age: 64
End: 2019-12-27
Payer: MEDICARE

## 2019-12-27 VITALS
DIASTOLIC BLOOD PRESSURE: 80 MMHG | BODY MASS INDEX: 31.55 KG/M2 | SYSTOLIC BLOOD PRESSURE: 128 MMHG | HEIGHT: 69 IN | WEIGHT: 213 LBS

## 2019-12-27 PROCEDURE — 99213 OFFICE O/P EST LOW 20 MIN: CPT

## 2019-12-27 NOTE — HISTORY OF PRESENT ILLNESS
[FreeTextEntry1] : Left heel ulcer, and has been putting bactroban recently.\par thinks it's healed, VNS no longer coming

## 2019-12-27 NOTE — PROCEDURE
[FreeTextEntry1] : I have had a lengthy discussion with the patient regarding overall skincare. The importance of the type of socks, the type of shoes, and the type of overall foot hygiene is important to help control and prevent eruptions especially over extreme weather changes. This included but was not limited to hydration and lubrication, dove soap, triple rinse clothing and linens. I also explained the importance of thorough drying of both feet especially the web spaces. Given the extreme temperatures back in a car I also reviewed the type of shoes that would help reduce the chances of cracking of the skin especially leading to fissuring of the heels. Overall skincare precautions were reviewed education literature dispensed in the patient's questions asked and answered appropriately.\par \par \par

## 2020-01-01 ENCOUNTER — RX RENEWAL (OUTPATIENT)
Age: 65
End: 2020-01-01

## 2020-01-01 ENCOUNTER — RESULT REVIEW (OUTPATIENT)
Age: 65
End: 2020-01-01

## 2020-01-01 ENCOUNTER — APPOINTMENT (OUTPATIENT)
Dept: CARDIOLOGY | Facility: CLINIC | Age: 65
End: 2020-01-01

## 2020-01-01 ENCOUNTER — APPOINTMENT (OUTPATIENT)
Dept: HEMATOLOGY ONCOLOGY | Facility: CLINIC | Age: 65
End: 2020-01-01
Payer: MEDICARE

## 2020-01-01 ENCOUNTER — APPOINTMENT (OUTPATIENT)
Dept: PAIN MANAGEMENT | Facility: CLINIC | Age: 65
End: 2020-01-01

## 2020-01-01 ENCOUNTER — APPOINTMENT (OUTPATIENT)
Dept: PODIATRY | Facility: CLINIC | Age: 65
End: 2020-01-01

## 2020-01-01 ENCOUNTER — APPOINTMENT (OUTPATIENT)
Dept: NEPHROLOGY | Facility: CLINIC | Age: 65
End: 2020-01-01

## 2020-01-01 ENCOUNTER — APPOINTMENT (OUTPATIENT)
Dept: HEMATOLOGY ONCOLOGY | Facility: CLINIC | Age: 65
End: 2020-01-01

## 2020-01-01 ENCOUNTER — APPOINTMENT (OUTPATIENT)
Dept: PODIATRY | Facility: CLINIC | Age: 65
End: 2020-01-01
Payer: MEDICARE

## 2020-01-01 VITALS
TEMPERATURE: 97.5 F | DIASTOLIC BLOOD PRESSURE: 66 MMHG | HEIGHT: 70 IN | SYSTOLIC BLOOD PRESSURE: 126 MMHG | BODY MASS INDEX: 33 KG/M2 | RESPIRATION RATE: 18 BRPM | HEART RATE: 56 BPM | WEIGHT: 230.5 LBS | OXYGEN SATURATION: 98 %

## 2020-01-01 VITALS
HEIGHT: 70 IN | DIASTOLIC BLOOD PRESSURE: 55 MMHG | RESPIRATION RATE: 18 BRPM | OXYGEN SATURATION: 100 % | BODY MASS INDEX: 33.21 KG/M2 | WEIGHT: 232 LBS | TEMPERATURE: 97.6 F | HEART RATE: 60 BPM | SYSTOLIC BLOOD PRESSURE: 124 MMHG

## 2020-01-01 VITALS
SYSTOLIC BLOOD PRESSURE: 134 MMHG | WEIGHT: 215.7 LBS | OXYGEN SATURATION: 100 % | HEIGHT: 70 IN | BODY MASS INDEX: 30.88 KG/M2 | HEART RATE: 58 BPM | RESPIRATION RATE: 18 BRPM | TEMPERATURE: 97.1 F | DIASTOLIC BLOOD PRESSURE: 72 MMHG

## 2020-01-01 VITALS — HEIGHT: 70 IN | BODY MASS INDEX: 33.5 KG/M2 | WEIGHT: 234 LBS

## 2020-01-01 VITALS
OXYGEN SATURATION: 98 % | DIASTOLIC BLOOD PRESSURE: 80 MMHG | BODY MASS INDEX: 33.64 KG/M2 | WEIGHT: 234.99 LBS | HEIGHT: 70 IN | HEART RATE: 77 BPM | TEMPERATURE: 96.9 F | RESPIRATION RATE: 18 BRPM | SYSTOLIC BLOOD PRESSURE: 146 MMHG

## 2020-01-01 DIAGNOSIS — K81.9 CHOLECYSTITIS, UNSPECIFIED: ICD-10-CM

## 2020-01-01 DIAGNOSIS — L89.521: ICD-10-CM

## 2020-01-01 DIAGNOSIS — K21.9 GASTRO-ESOPHAGEAL REFLUX DISEASE W/OUT ESOPHAGITIS: ICD-10-CM

## 2020-01-01 DIAGNOSIS — L89.896 PRESSURE-INDUCED DEEP TISSUE DAMAGE OF OTHER SITE: ICD-10-CM

## 2020-01-01 PROCEDURE — 99213 OFFICE O/P EST LOW 20 MIN: CPT | Mod: 25

## 2020-01-01 PROCEDURE — 99214 OFFICE O/P EST MOD 30 MIN: CPT

## 2020-01-01 PROCEDURE — 11055 PARING/CUTG B9 HYPRKER LES 1: CPT | Mod: Q8

## 2020-01-01 PROCEDURE — 11721 DEBRIDE NAIL 6 OR MORE: CPT | Mod: 59

## 2020-01-01 PROCEDURE — 99215 OFFICE O/P EST HI 40 MIN: CPT

## 2020-01-01 RX ORDER — PANTOPRAZOLE 40 MG/1
40 TABLET, DELAYED RELEASE ORAL DAILY
Qty: 30 | Refills: 6 | Status: ACTIVE | COMMUNITY
Start: 2020-01-01 | End: 1900-01-01

## 2020-01-01 RX ORDER — PANTOPRAZOLE 40 MG/1
40 TABLET, DELAYED RELEASE ORAL DAILY
Qty: 30 | Refills: 6 | Status: COMPLETED | COMMUNITY
Start: 2020-08-13 | End: 2020-01-01

## 2020-01-15 ENCOUNTER — APPOINTMENT (OUTPATIENT)
Dept: PODIATRY | Facility: CLINIC | Age: 65
End: 2020-01-15
Payer: MEDICARE

## 2020-01-15 VITALS
DIASTOLIC BLOOD PRESSURE: 80 MMHG | BODY MASS INDEX: 31.55 KG/M2 | SYSTOLIC BLOOD PRESSURE: 130 MMHG | WEIGHT: 213 LBS | HEIGHT: 69 IN

## 2020-01-15 PROCEDURE — 11055 PARING/CUTG B9 HYPRKER LES 1: CPT | Mod: Q8

## 2020-01-15 PROCEDURE — 11721 DEBRIDE NAIL 6 OR MORE: CPT | Mod: 59

## 2020-01-15 NOTE — PROCEDURE
[FreeTextEntry1] : Using sterile instrumentation debridement of all nails manually and electrically to decrease thickness, pain and girth and make shoe gear more comfortable with "slant back" procedure of any bordering spicules causing pain\par \par Shaving and padding of a benign hyperkeratotic lesion\par

## 2020-01-30 ENCOUNTER — APPOINTMENT (OUTPATIENT)
Dept: RADIATION ONCOLOGY | Facility: CLINIC | Age: 65
End: 2020-01-30
Payer: MEDICARE

## 2020-01-30 VITALS
DIASTOLIC BLOOD PRESSURE: 76 MMHG | HEIGHT: 69 IN | BODY MASS INDEX: 33.33 KG/M2 | WEIGHT: 225 LBS | TEMPERATURE: 98 F | OXYGEN SATURATION: 100 % | SYSTOLIC BLOOD PRESSURE: 177 MMHG | HEART RATE: 56 BPM | RESPIRATION RATE: 18 BRPM

## 2020-01-30 PROCEDURE — 99214 OFFICE O/P EST MOD 30 MIN: CPT | Mod: 25

## 2020-01-30 PROCEDURE — 92511 NASOPHARYNGOSCOPY: CPT

## 2020-02-03 NOTE — PHYSICAL EXAM
[Outer Ear] : the ears and nose were normal in appearance [Examination Of The Oral Cavity] : the lips and gums were normal [Both Tympanic Membranes Were Examined] : both tympanic membranes were normal [Nasal Cavity] : the nasal mucosa and septum were normal [Normal] : no palpable adenopathy [de-identified] : unable to palpate an area of firmness along the base of tongue

## 2020-02-03 NOTE — PROCEDURE
[Topical Lidocaine] : topical lidocaine [Flexible Endoscope] : examined with the flexible endoscope [Photographs Taken] : photographs taken [Mass ___ cm] : [unfilled]Ucm mass on the base of the tongue [de-identified] : mass along the left base of tongue [Normal] : the true vocal cords ~T were normal

## 2020-02-03 NOTE — HISTORY OF PRESENT ILLNESS
[FreeTextEntry1] : Mr. 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 Laryngoscopy was performed (5/7/19), which revealed a mucosal lesion in the pharynx and a possible left tongue mass. He is an active smoker (1 ppd x 50 years) and has a history alcohol abuse with sobriety for 5 years. Initial FNA of left neck mass in May was inconclusive and revealed suspicious findings for squamous cell carcinoma. PET/CT (5/23/19) demonstrated metastatic cancer of the base of the tongue measuring 2.1 cm (SUV 13.96) with bilateral cervical adenopathy (largest on the right 2.5 cm and on the left 1.3 cm). Repeat biopsy on 6/24/19 revealed extensive squamous cell carcinoma in site with focal invasion, p16+. 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 \par \par He underwent CT simulation for radiation planning on 7/23/19. Due to many concerns regarding treatment plan with concurrent chemotherapy, he requested to postpone his treatment start date.  He decided to start radiation therapy on 9/3/2019  and refused chemotherapy at that time.  He tolerated his treatment with the expected side effects but terminated his treatment against medical advise after 15 fractions. He reports the dysphagia and odynophagia was too severe to continue and refused all recommended medications. He underwent a follow-up CT Neck (11/25/2019) which demonstrated marked improvement at the tongue base and a decrease in size in the neck adenopathy. CT Head was negative for metastatic disease. He presents today with complaints of increasing ear pain and left-sided neck pain x 2 weeks. He also reports cold like symptoms initially. He reports difficulty swallowing and eat and recent weight loss. At this time, he is still unsure about palliative radiation the area of concern but would like to be evaluated for progression of disease.

## 2020-02-03 NOTE — REVIEW OF SYSTEMS
[Dysphagia] : dysphagia [Odynophagia] : no odynophagia [Cough] : no cough [Joint Pain] : no joint pain

## 2020-03-04 ENCOUNTER — RESULT REVIEW (OUTPATIENT)
Age: 65
End: 2020-03-04

## 2020-03-04 ENCOUNTER — APPOINTMENT (OUTPATIENT)
Dept: HEMATOLOGY ONCOLOGY | Facility: CLINIC | Age: 65
End: 2020-03-04
Payer: MEDICARE

## 2020-03-04 VITALS
HEART RATE: 54 BPM | RESPIRATION RATE: 18 BRPM | WEIGHT: 217.49 LBS | DIASTOLIC BLOOD PRESSURE: 79 MMHG | TEMPERATURE: 100 F | SYSTOLIC BLOOD PRESSURE: 150 MMHG | OXYGEN SATURATION: 100 % | HEIGHT: 68.98 IN | BODY MASS INDEX: 32.21 KG/M2

## 2020-03-04 PROCEDURE — 99215 OFFICE O/P EST HI 40 MIN: CPT

## 2020-03-04 RX ORDER — OXYCODONE HYDROCHLORIDE 5 MG/5ML
5 SOLUTION ORAL EVERY 6 HOURS
Qty: 600 | Refills: 0 | Status: COMPLETED | COMMUNITY
Start: 2019-09-16 | End: 2020-03-04

## 2020-03-04 RX ORDER — DIPHENHYDRAMINE HYDROCHLORIDE AND LIDOCAINE HYDROCHLORIDE AND ALUMINUM HYDROXIDE AND MAGNESIUM HYDRO
KIT
Qty: 1 | Refills: 1 | Status: COMPLETED | COMMUNITY
Start: 2019-09-10 | End: 2020-03-04

## 2020-03-04 NOTE — ASSESSMENT
[FreeTextEntry1] : 65 yo male referred by Dr. 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 Patient has palpable left cervical adenopathy. \par \par Patient offered chemo/ RT - proceeded with RT only, reviewed 3000 cGy of 7000 cGY, stopped the treatment b/o poor tolerance in October 2019. \par \par Improved swallowing, left ear > right congestion. \par Left side of the neck - induration at the base of the neck \par \par Plan\par - restaging PETCT \par - consider immunotherapy\par - check TSH and CEA\par \par

## 2020-03-04 NOTE — REVIEW OF SYSTEMS
[Fatigue] : fatigue [Recent Change In Weight] : ~T recent weight change [FreeTextEntry4] : left ear pain/ sore throat

## 2020-03-04 NOTE — CONSULT LETTER
[Dear  ___] : Dear  [unfilled], [Consult Letter:] : I had the pleasure of evaluating your patient, [unfilled]. [Please see my note below.] : Please see my note below. [Consult Closing:] : Thank you very much for allowing me to participate in the care of this patient.  If you have any questions, please do not hesitate to contact me. [Sincerely,] : Sincerely, [DrEvie  ___] : Dr. CHOWDARY [DrEvie ___] : Dr. CHOWDARY [FreeTextEntry3] : Ana Velasquez MD\par [facility] [place] [facility]\par

## 2020-03-04 NOTE — PHYSICAL EXAM
[Fully active, able to carry on all pre-disease performance without restriction] : Status 0 - Fully active, able to carry on all pre-disease performance without restriction [Normal] : grossly intact [de-identified] : left posterior palate - induration together with left tonsilar infiltration, LEft cervical adenopathy

## 2020-03-04 NOTE — HISTORY OF PRESENT ILLNESS
[de-identified] : 63 year old male presents today for new diagnosis of head and neck cancer, referred by Dr. Kaur, ENT.  Mr. Camacho was sent to ENT for a left neck mass with associated left tongue base.  He reported a 3 month history of constant, moderate progressive left sided throat pain with radiating otalgia.  Incidentally it was noted on neck US with vascular surgeon to have a left neck mass.  He has a long standing history of cigarette smoking 5- 6 cigarettes per day and uses alcohol in past however has quit x 5 years.  CT scan done 5/16/19 showed a 5.5cm multiloculated solid and cystic mass within the left latera; neck soft tissues.  Findings may reflect primary neoplasm versus conglomerate of left level 3 cystic/necrotic lymphadenopathy.  Additional abnormal left level 2-4 lymphadenopathy.  Nonspecific prominence of the left lingual tonsils with enhancing soft tissue involving the left oropharyngeal lateral wall.  Regional effacement of fat planes laterally involving the left submandibular gland and posteriorly involving the carotid sheath.  Mild asymmetric enlargement of the left submandibular gland.  PET CT is recommend.  He has not had a biopsy.  He has complaints of a sore throat, with left ear pain at times and new onset of headaches.  \par \par Patient was adopted so he does not know his family history [de-identified] : Patient presents today for follow up of head and neck cancer- finished RT in Jan 2020 15/35- could not tolerate due to side effects- weight loss could not eat.  Is having some left ear pain and had sore throat off and on was treated with abx- completed last week \par \par PET CT- 7/1/19- met cancer of the base of the tongue with b/l cervical adenopathy

## 2020-03-11 ENCOUNTER — RX RENEWAL (OUTPATIENT)
Age: 65
End: 2020-03-11

## 2020-03-18 ENCOUNTER — APPOINTMENT (OUTPATIENT)
Dept: PODIATRY | Facility: CLINIC | Age: 65
End: 2020-03-18
Payer: MEDICARE

## 2020-03-18 VITALS
HEIGHT: 68.98 IN | BODY MASS INDEX: 32.14 KG/M2 | DIASTOLIC BLOOD PRESSURE: 80 MMHG | SYSTOLIC BLOOD PRESSURE: 126 MMHG | WEIGHT: 217 LBS

## 2020-03-18 PROCEDURE — 99213 OFFICE O/P EST LOW 20 MIN: CPT | Mod: 25

## 2020-03-18 PROCEDURE — 11721 DEBRIDE NAIL 6 OR MORE: CPT | Mod: 59

## 2020-03-18 PROCEDURE — 11042 DBRDMT SUBQ TIS 1ST 20SQCM/<: CPT | Mod: RT

## 2020-03-18 NOTE — PHYSICAL EXAM
[General Appearance - Alert] : alert [General Appearance - In No Acute Distress] : in no acute distress [FreeTextEntry1] : diabetic neuropathy-requests refill on gabapentin

## 2020-03-18 NOTE — PROCEDURE
[FreeTextEntry1] : Using sterile instrumentation debridement of all nails manually and electrically to decrease thickness, pain and girth and make shoe gear more comfortable with "slant back" procedure of any bordering spicules causing pain\par \par Shaving and padding of a benign hyperkeratotic lesion resulting in a full thickness ulceration\par \par a sharp full thickness excisional debridement utilizing a scalpel tissue nipper as well as a curette into the subcutaneous tissue level was performed. Bleeding was mild and was controlled with pressure. , discharge orders as well as wound care orders were reviewed with the patient and a follow up appointment given, Bactroban to be applied on a daily basis\par \par follow up appt 1 week\par

## 2020-03-26 ENCOUNTER — APPOINTMENT (OUTPATIENT)
Dept: PODIATRY | Facility: CLINIC | Age: 65
End: 2020-03-26
Payer: MEDICARE

## 2020-03-26 VITALS
WEIGHT: 217 LBS | DIASTOLIC BLOOD PRESSURE: 80 MMHG | BODY MASS INDEX: 32.14 KG/M2 | HEIGHT: 68.98 IN | SYSTOLIC BLOOD PRESSURE: 130 MMHG

## 2020-03-26 PROCEDURE — 11042 DBRDMT SUBQ TIS 1ST 20SQCM/<: CPT

## 2020-03-26 NOTE — PHYSICAL EXAM
[General Appearance - Alert] : alert [General Appearance - In No Acute Distress] : in no acute distress [FreeTextEntry1] : diabetic neuropathy

## 2020-03-26 NOTE — HISTORY OF PRESENT ILLNESS
[FreeTextEntry1] : Location-sub 5 right\par Duration-a week since initial tx\par Past tx-bactroban and offloafding\par

## 2020-03-26 NOTE — PROCEDURE
[FreeTextEntry1] : \par \par a sharp full thickness excisional debridement utilizing a scalpel tissue nipper as well as a curette into the subcutaneous tissue level was performed. Bleeding was mild and was controlled with pressure. , discharge orders as well as wound care orders were reviewed with the patient and a follow up appointment given, Bactroban to be applied on a daily basis\par \par follow up appt 1 week\par

## 2020-04-01 ENCOUNTER — RESULT REVIEW (OUTPATIENT)
Age: 65
End: 2020-04-01

## 2020-04-01 ENCOUNTER — APPOINTMENT (OUTPATIENT)
Dept: HEMATOLOGY ONCOLOGY | Facility: CLINIC | Age: 65
End: 2020-04-01
Payer: MEDICARE

## 2020-04-01 VITALS
TEMPERATURE: 97.5 F | RESPIRATION RATE: 18 BRPM | OXYGEN SATURATION: 99 % | HEART RATE: 54 BPM | WEIGHT: 220 LBS | DIASTOLIC BLOOD PRESSURE: 70 MMHG | SYSTOLIC BLOOD PRESSURE: 138 MMHG | BODY MASS INDEX: 32.58 KG/M2 | HEIGHT: 68.98 IN

## 2020-04-01 PROCEDURE — 99215 OFFICE O/P EST HI 40 MIN: CPT

## 2020-04-01 NOTE — CONSULT LETTER
[Dear  ___] : Dear  [unfilled], [Consult Letter:] : I had the pleasure of evaluating your patient, [unfilled]. [Please see my note below.] : Please see my note below. [Consult Closing:] : Thank you very much for allowing me to participate in the care of this patient.  If you have any questions, please do not hesitate to contact me. [Sincerely,] : Sincerely, [DrEvie  ___] : Dr. CHOWDARY [DrEvie ___] : Dr. CHOWDARY [FreeTextEntry3] : Ana Velasquez MD\par [facility] [place] [facility]\par

## 2020-04-01 NOTE — ASSESSMENT
[FreeTextEntry1] : 63 yo male referred by Dr. 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 Patient has palpable left cervical adenopathy. \par \par Patient offered chemo/ RT - proceeded with RT only, reviewed 3000 cGy of 7000 cGY, stopped the treatment b/o poor tolerance in October 2019. \par \par Improved swallowing, left ear > right congestion. \par Left side of the neck - induration at the base of the neck \par \par Plan\par - restaging PETCT reviewed- locally advanced disease, with increased pain\par - reviewed with patient PETCT - would consider biopsy, but due to COVID unable to perform elective biopsy\par - order [facility] one on tissue from August from [facility]\par - start Pembrolizumab pending PDL-1\par - side effects and schema explained to patient\par - tramadol plus magic mouth wash \par - hyperkalemia - repeat K \par \par \par \par

## 2020-04-01 NOTE — PHYSICAL EXAM
[Fully active, able to carry on all pre-disease performance without restriction] : Status 0 - Fully active, able to carry on all pre-disease performance without restriction [Normal] : affect appropriate [de-identified] : left posterior palate - induration together with left tonsilar infiltration, LEft cervical adenopathy

## 2020-04-01 NOTE — HISTORY OF PRESENT ILLNESS
[de-identified] : 63 year old male presents today for new diagnosis of head and neck cancer, referred by Dr. Kaur, ENT.  Mr. Camacho was sent to ENT for a left neck mass with associated left tongue base.  He reported a 3 month history of constant, moderate progressive left sided throat pain with radiating otalgia.  Incidentally it was noted on neck US with vascular surgeon to have a left neck mass.  He has a long standing history of cigarette smoking 5- 6 cigarettes per day and uses alcohol in past however has quit x 5 years.  CT scan done 5/16/19 showed a 5.5cm multiloculated solid and cystic mass within the left latera; neck soft tissues.  Findings may reflect primary neoplasm versus conglomerate of left level 3 cystic/necrotic lymphadenopathy.  Additional abnormal left level 2-4 lymphadenopathy.  Nonspecific prominence of the left lingual tonsils with enhancing soft tissue involving the left oropharyngeal lateral wall.  Regional effacement of fat planes laterally involving the left submandibular gland and posteriorly involving the carotid sheath.  Mild asymmetric enlargement of the left submandibular gland.  PET CT is recommend.  He has not had a biopsy.  He has complaints of a sore throat, with left ear pain at times and new onset of headaches.  \par \par Patient was adopted so he does not know his family history [de-identified] : Patient presents today for follow up of head and neck cancer- finished RT in Jan 2020 15/35- could not tolerate due to side effects- weight loss could not eat.  Is having some left ear pain and had sore throat off and on was treated with abx- completed last week.\par Now increased pain with swallowing, radiating to left ear.\par

## 2020-04-02 ENCOUNTER — APPOINTMENT (OUTPATIENT)
Dept: PODIATRY | Facility: CLINIC | Age: 65
End: 2020-04-02
Payer: MEDICARE

## 2020-04-02 VITALS
HEIGHT: 68 IN | SYSTOLIC BLOOD PRESSURE: 140 MMHG | BODY MASS INDEX: 33.34 KG/M2 | DIASTOLIC BLOOD PRESSURE: 70 MMHG | WEIGHT: 220 LBS

## 2020-04-02 PROCEDURE — 99213 OFFICE O/P EST LOW 20 MIN: CPT

## 2020-04-02 NOTE — REVIEW OF SYSTEMS
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

## 2020-04-02 NOTE — HISTORY OF PRESENT ILLNESS
[FreeTextEntry1] : Location-sub 5 right\par Duration-a few week since initial tx\par Past tx-bactroban and offloafding with his extra depth shoes and p[lastazote\par

## 2020-04-02 NOTE — PROCEDURE
[FreeTextEntry1] : I have reviewed the care orders with the patient they understand and they also understands the long-term consequences of not following my wound care orders as well  precautions I have recommended  and shoe gear advice I have mentioned. betadine daily for 1 week\par follow up appt 3 week\par

## 2020-04-23 ENCOUNTER — RESULT REVIEW (OUTPATIENT)
Age: 65
End: 2020-04-23

## 2020-04-23 ENCOUNTER — APPOINTMENT (OUTPATIENT)
Dept: HEMATOLOGY ONCOLOGY | Facility: CLINIC | Age: 65
End: 2020-04-23
Payer: MEDICARE

## 2020-04-23 ENCOUNTER — APPOINTMENT (OUTPATIENT)
Dept: PODIATRY | Facility: CLINIC | Age: 65
End: 2020-04-23
Payer: MEDICARE

## 2020-04-23 VITALS
OXYGEN SATURATION: 100 % | DIASTOLIC BLOOD PRESSURE: 57 MMHG | BODY MASS INDEX: 31.98 KG/M2 | WEIGHT: 211 LBS | RESPIRATION RATE: 18 BRPM | SYSTOLIC BLOOD PRESSURE: 146 MMHG | HEIGHT: 67.99 IN | TEMPERATURE: 98.5 F | HEART RATE: 61 BPM

## 2020-04-23 VITALS
WEIGHT: 220 LBS | HEIGHT: 68 IN | SYSTOLIC BLOOD PRESSURE: 130 MMHG | BODY MASS INDEX: 33.34 KG/M2 | DIASTOLIC BLOOD PRESSURE: 80 MMHG

## 2020-04-23 PROCEDURE — 99215 OFFICE O/P EST HI 40 MIN: CPT

## 2020-04-23 PROCEDURE — 99213 OFFICE O/P EST LOW 20 MIN: CPT

## 2020-04-23 RX ORDER — FENTANYL 12 UG/H
12 PATCH, EXTENDED RELEASE TRANSDERMAL
Qty: 1 | Refills: 0 | Status: COMPLETED | COMMUNITY
Start: 2019-09-25 | End: 2020-04-23

## 2020-04-23 NOTE — PROCEDURE
[FreeTextEntry1] : I have had a lengthy discussion with the patient regarding overall skincare. The importance of the type of socks, the type of shoes, and the type of overall foot hygiene is important to help control and prevent eruptions especially over extreme weather changes. This included but was not limited to hydration and lubrication, dove soap, triple rinse clothing and linens. I also explained the importance of thorough drying of both feet especially the web spaces. Given the extreme temperatures back in a car I also reviewed the type of shoes that would help reduce the chances of cracking of the skin especially leading to fissuring of the heels. Overall skincare precautions were reviewed education literature dispensed in the patient's questions asked and answered appropriately.\par follow up appt 4 weeks\par

## 2020-04-23 NOTE — PHYSICAL EXAM
[General Appearance - Alert] : alert [General Appearance - In No Acute Distress] : in no acute distress [Oriented To Time, Place, And Person] : oriented to person, place, and time [Affect] : the affect was normal [Impaired Insight] : insight and judgment were intact [FreeTextEntry1] : diabetic neuropathy

## 2020-04-23 NOTE — REVIEW OF SYSTEMS
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

## 2020-05-04 ENCOUNTER — RX RENEWAL (OUTPATIENT)
Age: 65
End: 2020-05-04

## 2020-05-14 ENCOUNTER — RESULT REVIEW (OUTPATIENT)
Age: 65
End: 2020-05-14

## 2020-05-14 ENCOUNTER — APPOINTMENT (OUTPATIENT)
Dept: HEMATOLOGY ONCOLOGY | Facility: CLINIC | Age: 65
End: 2020-05-14
Payer: MEDICARE

## 2020-05-14 VITALS
HEIGHT: 67.99 IN | BODY MASS INDEX: 32.58 KG/M2 | RESPIRATION RATE: 18 BRPM | DIASTOLIC BLOOD PRESSURE: 58 MMHG | SYSTOLIC BLOOD PRESSURE: 109 MMHG | WEIGHT: 214.99 LBS | HEART RATE: 56 BPM | TEMPERATURE: 98.3 F | OXYGEN SATURATION: 99 %

## 2020-05-14 PROCEDURE — 99215 OFFICE O/P EST HI 40 MIN: CPT

## 2020-05-14 RX ORDER — TRAMADOL HYDROCHLORIDE 50 MG/1
50 TABLET, COATED ORAL
Qty: 60 | Refills: 0 | Status: COMPLETED | COMMUNITY
Start: 2020-04-01 | End: 2020-05-14

## 2020-05-14 NOTE — PHYSICAL EXAM
[Fully active, able to carry on all pre-disease performance without restriction] : Status 0 - Fully active, able to carry on all pre-disease performance without restriction [Normal] : affect appropriate [de-identified] : left posterior palate - induration together with left tonsilar infiltration, Left cervical adenopathy; neck asymmetry left > right

## 2020-05-14 NOTE — REVIEW OF SYSTEMS
[Recent Change In Weight] : ~T recent weight change [Dysphagia] : dysphagia [Diarrhea] : diarrhea [Negative] : Heme/Lymph [Fatigue] : no fatigue [FreeTextEntry4] : worsening bilateral jaw pain radiating to ear; sore throat

## 2020-05-14 NOTE — CONSULT LETTER
[Dear  ___] : Dear  [unfilled], [Consult Letter:] : I had the pleasure of evaluating your patient, [unfilled]. [Please see my note below.] : Please see my note below. [Consult Closing:] : Thank you very much for allowing me to participate in the care of this patient.  If you have any questions, please do not hesitate to contact me. [Sincerely,] : Sincerely, [DrEvie  ___] : Dr. CHOWDARY [DrEvie ___] : Dr. CHOWDARY [FreeTextEntry3] : Ana Velasquez MD\par [facility] [place] at [facility]\par

## 2020-05-14 NOTE — ASSESSMENT
[FreeTextEntry1] : 63 yo male referred by Dr. 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 Patient has palpable left cervical adenopathy. \par \par Patient offered chemo/ RT - proceeded with RT only, received 3000 cGy of 7000 cGY, stopped the treatment b/o poor tolerance in October 2019. \par \par Improved swallowing, left ear > right congestion. \par Left side of the neck - induration at the base of the neck \par To see Dr. YING Mariano\par Tumor p16 positive\par  Foudation One- PDL-1 1- ( positive) \par  Patient evaluated by Dr. YING Ureña - not a candidate for surgery\par - Refered to Dr. Thapa for evaluation for palliative RT \par - restaging PETCT reviewed- locally advanced disease, with increased pain\par \par Plan\par Pembrolizumab - cycle 2 4/23/2020, cycle 3- 5/14/2020\par - side effects and schema explained to patient\par - tramadol plus magic mouth wash \par - gabapentin - increase the dose\par \par

## 2020-05-14 NOTE — HISTORY OF PRESENT ILLNESS
[de-identified] : 63 year old male presents today for new diagnosis of head and neck cancer, referred by Dr. Kaur, ENT.  Mr. Camacho was sent to ENT for a left neck mass with associated left tongue base.  He reported a 3 month history of constant, moderate progressive left sided throat pain with radiating otalgia.  Incidentally it was noted on neck US with vascular surgeon to have a left neck mass.  He has a long standing history of cigarette smoking 5- 6 cigarettes per day and uses alcohol in past however has quit x 5 years.  CT scan done 5/16/19 showed a 5.5cm multiloculated solid and cystic mass within the left latera; neck soft tissues.  Findings may reflect primary neoplasm versus conglomerate of left level 3 cystic/necrotic lymphadenopathy.  Additional abnormal left level 2-4 lymphadenopathy.  Nonspecific prominence of the left lingual tonsils with enhancing soft tissue involving the left oropharyngeal lateral wall.  Regional effacement of fat planes laterally involving the left submandibular gland and posteriorly involving the carotid sheath.  Mild asymmetric enlargement of the left submandibular gland.  PET CT is recommend.  He has not had a biopsy.  He has complaints of a sore throat, with left ear pain at times and new onset of headaches.  \par \par Patient was adopted so he does not know his family history [de-identified] : Patient presents today for follow up of head and neck cancer- finished RT in Jan 2020 15/35- could not tolerate due to side effects- weight loss could not eat.  Is having soan significant increase in left ear pain, throat pain, and pain swallowing which is limiting his oral intake.  He has noticed an increase in chest congestion and coughing fits; he is having trouble clearing mucus. \par

## 2020-05-21 ENCOUNTER — APPOINTMENT (OUTPATIENT)
Dept: PODIATRY | Facility: CLINIC | Age: 65
End: 2020-05-21
Payer: MEDICARE

## 2020-05-21 VITALS
HEIGHT: 67.99 IN | BODY MASS INDEX: 32.43 KG/M2 | SYSTOLIC BLOOD PRESSURE: 110 MMHG | DIASTOLIC BLOOD PRESSURE: 70 MMHG | WEIGHT: 214 LBS

## 2020-05-21 PROCEDURE — 11721 DEBRIDE NAIL 6 OR MORE: CPT | Mod: 59

## 2020-05-21 PROCEDURE — 11055 PARING/CUTG B9 HYPRKER LES 1: CPT | Mod: Q8

## 2020-05-21 NOTE — HISTORY OF PRESENT ILLNESS
[FreeTextEntry1] : The patient presents for follow up of chronic and painful mycotic nail disease as well as painful kyperkeratoses. Past professional  tx's in the past in the presence of PAD have consisted of periodic debridements of both nails and painful keratoses which have offered significant relief in controlling of symptoms and the patient presents for follow up care.\par \par \par

## 2020-05-22 NOTE — CONSULT LETTER
[Dear  ___] : Dear  [unfilled], [Please see my note below.] : Please see my note below. [Consult Letter:] : I had the pleasure of evaluating your patient, [unfilled]. [Sincerely,] : Sincerely, [Consult Closing:] : Thank you very much for allowing me to participate in the care of this patient.  If you have any questions, please do not hesitate to contact me. [DrEvie ___] : Dr. CHOWDARY [DrEvie  ___] : Dr. CHOWDARY [FreeTextEntry3] : Ana Velasquez MD\par [facility] [place] at [facility]\par

## 2020-05-22 NOTE — HISTORY OF PRESENT ILLNESS
[de-identified] : 63 year old male presents today for new diagnosis of head and neck cancer, referred by Dr. Kaur, ENT.  Mr. Camacho was sent to ENT for a left neck mass with associated left tongue base.  He reported a 3 month history of constant, moderate progressive left sided throat pain with radiating otalgia.  Incidentally it was noted on neck US with vascular surgeon to have a left neck mass.  He has a long standing history of cigarette smoking 5- 6 cigarettes per day and uses alcohol in past however has quit x 5 years.  CT scan done 5/16/19 showed a 5.5cm multiloculated solid and cystic mass within the left latera; neck soft tissues.  Findings may reflect primary neoplasm versus conglomerate of left level 3 cystic/necrotic lymphadenopathy.  Additional abnormal left level 2-4 lymphadenopathy.  Nonspecific prominence of the left lingual tonsils with enhancing soft tissue involving the left oropharyngeal lateral wall.  Regional effacement of fat planes laterally involving the left submandibular gland and posteriorly involving the carotid sheath.  Mild asymmetric enlargement of the left submandibular gland.  PET CT is recommend.  He has not had a biopsy.  He has complaints of a sore throat, with left ear pain at times and new onset of headaches.  \par \par Patient was adopted so he does not know his family history [de-identified] : Patient presents today for follow up of head and neck cancer- finished RT in Jan 2020 15/35- could not tolerate due to side effects- weight loss could not eat.  Is having some left ear pain and had sore throat off and on was treated with abx- completed last week.\par Now increased pain with swallowing, radiating to left ear.\par

## 2020-05-22 NOTE — ASSESSMENT
[FreeTextEntry1] : 63 yo male referred by Dr. 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 Patient has palpable left cervical adenopathy. \par \par Patient offered chemo/ RT - proceeded with RT only, received 3000 cGy of 7000 cGY, stopped the treatment b/o poor tolerance in October 2019. \par \par Improved swallowing, left ear > right congestion. \par Left side of the neck - induration at the base of the neck \par To see Dr. YING Mariano\par Tumor p16 positive\par Pending Foudation One\par \par Plan\par - restaging PETCT reviewed- locally advanced disease, with increased pain\par - reviewed with patient PETCT - would consider biopsy, but due to COVID unable to perform elective biopsy\par - order Foundation one on tissue from  [facility] from [facility]\par - Pembrolizumab - cycle 2 4/23/2020\par - side effects and schema explained to patient\par - tramadol plus magic mouth wash \par - hyperkalemia - repeat K \par \par \par Pain in the neck left side radiating to ear\par - takes tylenol, still in pain\par - did not respond to tramadol\par - try Gabapentin 300 mg\par \par \par

## 2020-05-22 NOTE — PHYSICAL EXAM
[Fully active, able to carry on all pre-disease performance without restriction] : Status 0 - Fully active, able to carry on all pre-disease performance without restriction [Normal] : normal appearance, no rash, nodules, vesicles, ulcers, erythema [de-identified] : left posterior palate - induration together with left tonsilar infiltration, LEft cervical adenopathy

## 2020-05-28 ENCOUNTER — APPOINTMENT (OUTPATIENT)
Dept: RADIATION ONCOLOGY | Facility: CLINIC | Age: 65
End: 2020-05-28
Payer: MEDICARE

## 2020-05-28 PROCEDURE — 99442: CPT | Mod: 95

## 2020-05-28 NOTE — REVIEW OF SYSTEMS
[Dysphagia] : dysphagia [Cough] : no cough [Odynophagia] : no odynophagia [Joint Pain] : no joint pain

## 2020-05-28 NOTE — HISTORY OF PRESENT ILLNESS
[Home] : at home, [unfilled] , at the time of the visit. [FreeTextEntry1] : \par Mr. 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 Laryngoscopy was performed (5/7/19), which revealed a mucosal lesion in the pharynx and a possible left tongue mass. He is an active smoker (1 ppd x 50 years) and has a history alcohol abuse with sobriety for 5 years. Initial FNA of left neck mass in May was inconclusive and revealed suspicious findings for squamous cell carcinoma. PET/CT (5/23/19) demonstrated metastatic cancer of the base of the tongue measuring 2.1 cm (SUV 13.96) with bilateral cervical adenopathy (largest on the right 2.5 cm and on the left 1.3 cm). Repeat biopsy on 6/24/19 revealed extensive squamous cell carcinoma in site with focal invasion, p16+. 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 \par \par He underwent CT simulation for radiation planning on 7/23/19. Due to many concerns regarding treatment plan with concurrent chemotherapy, he requested to postpone his treatment start date.  He decided to start radiation therapy on 9/3/2019  and refused chemotherapy at that time.  He tolerated his treatment with the expected side effects but terminated his treatment against medical advise after 15 fractions. He reports the dysphagia and odynophagia was too severe to continue and refused all recommended medications. He underwent a follow-up CT Neck (11/25/2019) which demonstrated marked improvement at the tongue base and a decrease in size in the neck adenopathy. CT Head was negative for metastatic disease. At his follow-up in January, he had complaints of increasing ear pain and left-sided neck pain x 2 weeks but still did  not want to pursue palliative radiation therapy. \par \par A PET CT was performed on 3/20/20 and demonstrated an interval resolution of the majority of previously seen left cervical lymph nodes with the exception of the necrotic level III lymph node that measured 2.1 x1.7cm.  There was improved but persistent focal uptake in the asymmetrically enlarged left base of tongue.  Mr. Camacho began noticing worsening dyphagia and odynophagia, as well as a growth along his left neck.  He was evaulated by Dr. Kaur and was not thought to be an appropriate surgical candidate. He started immunotherapy with Keytruda in April under Dr. Velasquez and his most recent cycle was given on 5/14/20. He is being referred for a re-evaluation for palliative radiation therapy.  [Medical Office: ([facility])___] : at the medical office located in

## 2020-05-28 NOTE — LETTER CLOSING
[Consult Closing:] : Thank you for allowing me to participate in the care of this patient.  If you have any questions, please do not hesitate to contact me. [FreeTextEntry3] : Ally Thapa MD\par  [Sincerely yours,] : Sincerely yours,

## 2020-06-01 ENCOUNTER — APPOINTMENT (OUTPATIENT)
Dept: RADIATION ONCOLOGY | Facility: CLINIC | Age: 65
End: 2020-06-01
Payer: MEDICARE

## 2020-06-01 VITALS
RESPIRATION RATE: 18 BRPM | TEMPERATURE: 97 F | SYSTOLIC BLOOD PRESSURE: 148 MMHG | HEIGHT: 67 IN | DIASTOLIC BLOOD PRESSURE: 55 MMHG | HEART RATE: 52 BPM | BODY MASS INDEX: 35 KG/M2 | WEIGHT: 223 LBS | OXYGEN SATURATION: 100 %

## 2020-06-01 PROCEDURE — 99213 OFFICE O/P EST LOW 20 MIN: CPT

## 2020-06-01 NOTE — HISTORY OF PRESENT ILLNESS
[FreeTextEntry1] : Follow up after telehealth visit. Physical exam and vitals preformed. \par \par Mr. 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 Due to many concerns regarding treatment plan with concurrent chemotherapy, he requested to postpone his treatment start date. He decided to start radiation therapy on 9/3/2019 and refused chemotherapy at that time. He tolerated his treatment with the expected side effects but terminated his treatment against medical advise after 15 fractions.\par On subsequent follow up visits Mr. Camacho refused additional radiation. More recently he presented with dysphagia and odynophagia and is now receptive to systemic therapy and additional radiation. He started Keytruda and we discussed the potential goals and benefits of palliative radiation. \par \par \par Patients treatment options and potential side effects were discussed. An informed consent was obtained.

## 2020-06-01 NOTE — REVIEW OF SYSTEMS
[Shortness Of Breath] : no shortness of breath [Cough] : no cough [SOB on Exertion] : no shortness of breath during exertion [Abdominal Pain] : no abdominal pain [Constipation] : no constipation [Diarrhea] : no diarrhea [FreeTextEntry4] : "difficulty swallowing"

## 2020-06-01 NOTE — PHYSICAL EXAM
[Normal] : normoactive bowel sounds, soft and nontender, no hepatosplenomegaly or masses appreciated [de-identified] : Nodularity along the left neck around level II/III. Scar tissue along anterior next from prior radiation [de-identified] : palpable left cervical neck LAD

## 2020-06-04 ENCOUNTER — RESULT REVIEW (OUTPATIENT)
Age: 65
End: 2020-06-04

## 2020-06-04 ENCOUNTER — APPOINTMENT (OUTPATIENT)
Dept: HEMATOLOGY ONCOLOGY | Facility: CLINIC | Age: 65
End: 2020-06-04
Payer: MEDICARE

## 2020-06-04 VITALS
WEIGHT: 227 LBS | DIASTOLIC BLOOD PRESSURE: 66 MMHG | BODY MASS INDEX: 35.63 KG/M2 | TEMPERATURE: 97.6 F | OXYGEN SATURATION: 98 % | HEART RATE: 56 BPM | RESPIRATION RATE: 18 BRPM | HEIGHT: 66.97 IN | SYSTOLIC BLOOD PRESSURE: 115 MMHG

## 2020-06-04 PROCEDURE — 99215 OFFICE O/P EST HI 40 MIN: CPT

## 2020-06-04 NOTE — PHYSICAL EXAM
[Fully active, able to carry on all pre-disease performance without restriction] : Status 0 - Fully active, able to carry on all pre-disease performance without restriction [Normal] : grossly intact [de-identified] : left posterior palate - induration together with left tonsilar infiltration, Left cervical adenopathy; neck asymmetry left > right

## 2020-06-04 NOTE — HISTORY OF PRESENT ILLNESS
[de-identified] : Patient presents today for follow up of head and neck cancer- finished RT in Jan 2020 15/35- could not tolerate due to side effects- weight loss could not eat.  Is having soan significant increase in left ear pain, throat pain, and pain swallowing which is limiting his oral intake.  He has noticed an increase in chest congestion and coughing fits; he is having trouble clearing mucus. \par  [de-identified] : 63 year old male presents today for new diagnosis of head and neck cancer, referred by Dr. Kaur, ENT.  Mr. Camacho was sent to ENT for a left neck mass with associated left tongue base.  He reported a 3 month history of constant, moderate progressive left sided throat pain with radiating otalgia.  Incidentally it was noted on neck US with vascular surgeon to have a left neck mass.  He has a long standing history of cigarette smoking 5- 6 cigarettes per day and uses alcohol in past however has quit x 5 years.  CT scan done 5/16/19 showed a 5.5cm multiloculated solid and cystic mass within the left latera; neck soft tissues.  Findings may reflect primary neoplasm versus conglomerate of left level 3 cystic/necrotic lymphadenopathy.  Additional abnormal left level 2-4 lymphadenopathy.  Nonspecific prominence of the left lingual tonsils with enhancing soft tissue involving the left oropharyngeal lateral wall.  Regional effacement of fat planes laterally involving the left submandibular gland and posteriorly involving the carotid sheath.  Mild asymmetric enlargement of the left submandibular gland.  PET CT is recommend.  He has not had a biopsy.  He has complaints of a sore throat, with left ear pain at times and new onset of headaches.  \par \par Patient was adopted so he does not know his family history

## 2020-06-04 NOTE — CONSULT LETTER
[Dear  ___] : Dear  [unfilled], [Consult Letter:] : I had the pleasure of evaluating your patient, [unfilled]. [Please see my note below.] : Please see my note below. [Sincerely,] : Sincerely, [Consult Closing:] : Thank you very much for allowing me to participate in the care of this patient.  If you have any questions, please do not hesitate to contact me. [DrEvie  ___] : Dr. CHOWDARY [DrEvie ___] : Dr. CHOWDARY [FreeTextEntry3] : Ana Velasquez MD\par [facility] [place] at [facility]\par

## 2020-06-04 NOTE — ASSESSMENT
[FreeTextEntry1] : 63 yo male referred by Dr. 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 Patient has palpable left cervical adenopathy. \par \par Patient offered chemo/ RT - proceeded with RT only, received 3000 cGy of 7000 cGY, stopped the treatment b/o poor tolerance in October 2019. \par \par Improved swallowing, left ear > right congestion. \par Left side of the neck - induration at the base of the neck \par To see Dr. YING Mariano\par Tumor p16 positive\par  Foudation One- PDL-1 1- ( positive) \par  Patient evaluated by Dr. YING Ureña - not a candidate for surgery\par - Refered to Dr. Thapa for evaluation for palliative RT \par - restaging PETCT reviewed- locally advanced disease, with increased pain\par \par Plan\par Pembrolizumab - cycle 2 4/23/2020, cycle 3- 5/14/2020, cycle 4 6/4/2020\par - side effects and schema explained to patient\par - tramadol plus magic mouth wash \par - gabapentin - increase the dose\par - check TSH, cortisol, ACTH\par \par

## 2020-06-23 ENCOUNTER — RESULT REVIEW (OUTPATIENT)
Age: 65
End: 2020-06-23

## 2020-06-25 ENCOUNTER — APPOINTMENT (OUTPATIENT)
Dept: HEMATOLOGY ONCOLOGY | Facility: CLINIC | Age: 65
End: 2020-06-25
Payer: MEDICARE

## 2020-06-25 ENCOUNTER — APPOINTMENT (OUTPATIENT)
Dept: PAIN MANAGEMENT | Facility: CLINIC | Age: 65
End: 2020-06-25
Payer: MEDICARE

## 2020-06-25 ENCOUNTER — RESULT REVIEW (OUTPATIENT)
Age: 65
End: 2020-06-25

## 2020-06-25 VITALS
DIASTOLIC BLOOD PRESSURE: 60 MMHG | WEIGHT: 223 LBS | SYSTOLIC BLOOD PRESSURE: 120 MMHG | BODY MASS INDEX: 35.84 KG/M2 | HEIGHT: 66 IN | TEMPERATURE: 98.6 F

## 2020-06-25 VITALS
DIASTOLIC BLOOD PRESSURE: 61 MMHG | RESPIRATION RATE: 18 BRPM | HEIGHT: 66.97 IN | HEART RATE: 53 BPM | TEMPERATURE: 98.4 F | SYSTOLIC BLOOD PRESSURE: 128 MMHG | BODY MASS INDEX: 35.05 KG/M2 | OXYGEN SATURATION: 100 % | WEIGHT: 223.3 LBS

## 2020-06-25 DIAGNOSIS — R51 HEADACHE: ICD-10-CM

## 2020-06-25 PROCEDURE — 99214 OFFICE O/P EST MOD 30 MIN: CPT

## 2020-06-25 PROCEDURE — 99204 OFFICE O/P NEW MOD 45 MIN: CPT

## 2020-06-25 RX ORDER — DIPHENHYDRAMINE HYDROCHLORIDE AND LIDOCAINE HYDROCHLORIDE AND ALUMINUM HYDROXIDE AND MAGNESIUM HYDRO
KIT
Qty: 1 | Refills: 5 | Status: COMPLETED | COMMUNITY
Start: 2020-04-01 | End: 2020-06-25

## 2020-06-25 RX ORDER — SITAGLIPTIN AND METFORMIN HYDROCHLORIDE 50; 1000 MG/1; MG/1
50-1000 TABLET, FILM COATED ORAL
Refills: 0 | Status: COMPLETED | COMMUNITY
End: 2020-06-25

## 2020-06-25 RX ORDER — MUPIROCIN 20 MG/G
2 OINTMENT TOPICAL
Qty: 1 | Refills: 2 | Status: COMPLETED | COMMUNITY
Start: 2019-11-11 | End: 2020-06-25

## 2020-06-25 NOTE — CONSULT LETTER
[Dear  ___] : Dear  [unfilled], [Consult Letter:] : I had the pleasure of evaluating your patient, [unfilled]. [Please see my note below.] : Please see my note below. [Sincerely,] : Sincerely, [FreeTextEntry3] : Bassam Cespedes MD\par  [Consult Closing:] : Thank you very much for allowing me to participate in the care of this patient.  If you have any questions, please do not hesitate to contact me.

## 2020-06-25 NOTE — ASSESSMENT
[FreeTextEntry1] : 63 yom w/ head and neck cancer managed by Dr. Velasquez presents w/ left sided neck and jaw pain. He has a history of alcoholism and is concerned with trying new medications. Increase gabapentin to 800 mg TID. Start cymbalta 60 mg QHS for neuropathic pain. RTC in three weeks.

## 2020-06-25 NOTE — HISTORY OF PRESENT ILLNESS
[Paroxysmal] : paroxysmal [___ mths] : [unfilled] month(s) ago [7] : a maximum pain level of 7/10 [Sharp] : sharp [Medications] : medications [FreeTextEntry2] : 21 [FreeTextEntry1] : 63 yom w/ head and neck cancer managed by Dr. Velasquez presents w/ left sided neck and jaw pain. He has a history of alcoholism and is concerned with trying new medications. He does have relief with gabapentin which she also takes for diabetic neuropathy. Pain is worse with activity. Pain has been increasing. [FreeTextEntry7] : left side of ear into throat [FreeTextEntry3] : n/a

## 2020-06-25 NOTE — PHYSICAL EXAM
[de-identified] : Constitutional: Well-developed, in no acute distress\par Eyes: Pupil- Right: normal, Left: normal\par Neck exam: Inspection normal\par Respiratory: Normal effort, speaking in full sentences\par Cardiovascular: Regular rate and rhythm\par Vascular: Dorsal pedis pulses normal and equal bilaterally\par Abdomen: Inspection normal, no distension\par Skin: Inspection normal, no bruising noted\par Musculoskeletal:\par Cervical Spine:   \par Gait: Antalgic\par Inspection: Normal curvature, no abnormal kyphosis or scoliosis\par \par Facet loading: pain bilaterally\par \par Palpation:\par Cervical paraspinal muscles: pain bilaterally\par 		\par Muscle Strength:\par Deltoid: 5/5 bilaterally\par Biceps: 5/5 bilaterally\par Triceps: 5/5 bilaterally\par Adductor pollicis: 5/5 bilaterally\par \par Sensation: normal and equal in bilateral upper extremities\par \par Reflexes:\par Biceps (C5): 2+ bilaterally\par Brachioradialis (C6): 2+ bilaterally\par Triceps (C7): 2+ bilaterally\par \par Extremity: no edema noted\par Neurological: Memory normal, AAO x 3, Cranial nerves II - XII grossly normal\par Psychiatric: Appropriate mood and affect, oriented to time, place, person, and situation\par \par \par

## 2020-07-14 ENCOUNTER — RESULT REVIEW (OUTPATIENT)
Age: 65
End: 2020-07-14

## 2020-07-16 ENCOUNTER — RESULT REVIEW (OUTPATIENT)
Age: 65
End: 2020-07-16

## 2020-07-16 ENCOUNTER — APPOINTMENT (OUTPATIENT)
Dept: PAIN MANAGEMENT | Facility: CLINIC | Age: 65
End: 2020-07-16

## 2020-07-16 ENCOUNTER — APPOINTMENT (OUTPATIENT)
Dept: HEMATOLOGY ONCOLOGY | Facility: CLINIC | Age: 65
End: 2020-07-16
Payer: MEDICARE

## 2020-07-16 VITALS
DIASTOLIC BLOOD PRESSURE: 55 MMHG | TEMPERATURE: 96.4 F | SYSTOLIC BLOOD PRESSURE: 103 MMHG | RESPIRATION RATE: 18 BRPM | HEART RATE: 55 BPM | BODY MASS INDEX: 34.68 KG/M2 | OXYGEN SATURATION: 100 % | WEIGHT: 215.8 LBS | HEIGHT: 65.98 IN

## 2020-07-16 DIAGNOSIS — I95.89 OTHER HYPOTENSION: ICD-10-CM

## 2020-07-16 PROCEDURE — 99215 OFFICE O/P EST HI 40 MIN: CPT

## 2020-07-16 NOTE — CONSULT LETTER
[Consult Letter:] : I had the pleasure of evaluating your patient, [unfilled]. [Dear  ___] : Dear  [unfilled], [Consult Closing:] : Thank you very much for allowing me to participate in the care of this patient.  If you have any questions, please do not hesitate to contact me. [Please see my note below.] : Please see my note below. [Sincerely,] : Sincerely, [DrEvie  ___] : Dr. CHOWDARY [DrEvie ___] : Dr. CHOWDARY [FreeTextEntry3] : Ana Velasquez MD\par [facility] [place] at [facility]\par

## 2020-07-16 NOTE — HISTORY OF PRESENT ILLNESS
[de-identified] : Patient presents today for follow up of head and neck cancer- finished RT in Jan 2020 15/35- could not tolerate due to side effects- weight loss could not eat.  Patient with significant worsening left ear pain, throat pain, and pain swallowing with headaches. He is very concerned the cancer is progressing, but does not want to proceed with any treatment other than immunotherapy.  He can only tolerate soft/mushy food; he is no longer checking his sugar due to the limitation of food options from pain. He has noticed an increase in chest congestion and coughing fits; he is having trouble clearing mucus. \par  [de-identified] : 63 year old male presents today for new diagnosis of head and neck cancer, referred by Dr. Kaur, ENT.  Mr. Camacho was sent to ENT for a left neck mass with associated left tongue base.  He reported a 3 month history of constant, moderate progressive left sided throat pain with radiating otalgia.  Incidentally it was noted on neck US with vascular surgeon to have a left neck mass.  He has a long standing history of cigarette smoking 5- 6 cigarettes per day and uses alcohol in past however has quit x 5 years.  CT scan done 5/16/19 showed a 5.5cm multiloculated solid and cystic mass within the left latera; neck soft tissues.  Findings may reflect primary neoplasm versus conglomerate of left level 3 cystic/necrotic lymphadenopathy.  Additional abnormal left level 2-4 lymphadenopathy.  Nonspecific prominence of the left lingual tonsils with enhancing soft tissue involving the left oropharyngeal lateral wall.  Regional effacement of fat planes laterally involving the left submandibular gland and posteriorly involving the carotid sheath.  Mild asymmetric enlargement of the left submandibular gland.  PET CT is recommend.  He has not had a biopsy.  He has complaints of a sore throat, with left ear pain at times and new onset of headaches.  \par \par Patient was adopted so he does not know his family history

## 2020-07-16 NOTE — REVIEW OF SYSTEMS
[Dysphagia] : dysphagia [Diarrhea] : diarrhea [Negative] : Heme/Lymph [Recent Change In Weight] : ~T no recent weight change [Fatigue] : no fatigue [FreeTextEntry4] : worsening bilateral jaw pain radiating to ear; sore throat

## 2020-07-16 NOTE — PHYSICAL EXAM
[Fully active, able to carry on all pre-disease performance without restriction] : Status 0 - Fully active, able to carry on all pre-disease performance without restriction [Normal] : affect appropriate [de-identified] : left posterior palate - induration together with left tonsilar infiltration, Left cervical adenopathy; neck asymmetry left > right

## 2020-07-19 PROBLEM — I95.89 OTHER SPECIFIED HYPOTENSION: Status: ACTIVE | Noted: 2020-07-19

## 2020-07-19 NOTE — PHYSICAL EXAM
[Fully active, able to carry on all pre-disease performance without restriction] : Status 0 - Fully active, able to carry on all pre-disease performance without restriction [Normal] : affect appropriate [de-identified] : left posterior palate - induration together with left tonsilar infiltration, Left cervical adenopathy; neck asymmetry left > right

## 2020-07-19 NOTE — HISTORY OF PRESENT ILLNESS
[de-identified] : 63 year old male presents today for new diagnosis of head and neck cancer, referred by Dr. Kaur, ENT.  Mr. Camacho was sent to ENT for a left neck mass with associated left tongue base.  He reported a 3 month history of constant, moderate progressive left sided throat pain with radiating otalgia.  Incidentally it was noted on neck US with vascular surgeon to have a left neck mass.  He has a long standing history of cigarette smoking 5- 6 cigarettes per day and uses alcohol in past however has quit x 5 years.  CT scan done 5/16/19 showed a 5.5cm multiloculated solid and cystic mass within the left latera; neck soft tissues.  Findings may reflect primary neoplasm versus conglomerate of left level 3 cystic/necrotic lymphadenopathy.  Additional abnormal left level 2-4 lymphadenopathy.  Nonspecific prominence of the left lingual tonsils with enhancing soft tissue involving the left oropharyngeal lateral wall.  Regional effacement of fat planes laterally involving the left submandibular gland and posteriorly involving the carotid sheath.  Mild asymmetric enlargement of the left submandibular gland.  PET CT is recommend.  He has not had a biopsy.  He has complaints of a sore throat, with left ear pain at times and new onset of headaches.  \par \par Patient was adopted so he does not know his family history [de-identified] : Patient presents today for follow up of head and neck cancer- here for treatment- patient complains of dysphagia, left ear pain, and dizziness- where he feels like he is going to fall.  Gabapentin increased due to pain by pain mgmt

## 2020-07-19 NOTE — CONSULT LETTER
[Dear  ___] : Dear  [unfilled], [Please see my note below.] : Please see my note below. [Consult Letter:] : I had the pleasure of evaluating your patient, [unfilled]. [Consult Closing:] : Thank you very much for allowing me to participate in the care of this patient.  If you have any questions, please do not hesitate to contact me. [Sincerely,] : Sincerely, [DrEvie  ___] : Dr. CHOWDARY [DrEvie ___] : Dr. CHOWDARY [FreeTextEntry3] : Ana Velasquez MD\par [facility] [place] at [facility]\par

## 2020-07-19 NOTE — ASSESSMENT
[FreeTextEntry1] : 63 yo male referred by Dr. 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 Patient has palpable left cervical adenopathy. \par \par Patient offered chemo/ RT - proceeded with RT only, received 3000 cGy of 7000 cGY, stopped the treatment b/o poor tolerance in October 2019. \par \par Improved swallowing, left ear > right congestion. \par Left side of the neck - induration at the base of the neck \par To see Dr. YING Mariano\par Tumor p16 positive\par  Foudation One- PDL-1 1- ( positive) \par  Patient evaluated by Dr. YING Ureña - not a candidate for surgery\par - Refered to Dr. Thapa for evaluation for palliative RT \par - restaging PETCT reviewed- locally advanced disease, with increased pain\par \par Plan\par Pembrolizumab - cycle 2 4/23/2020, cycle 3- 5/14/2020, cycle 4 6/4/2020, cycle 6 /16/2020\par - side effects and schema explained to patient\par - tramadol plus magic mouth wash \par - gabapentin - increase the dose\par - check TSH, cortisol, ACTH\par - BP decreased - patient on Losartan/ Metoprolol- will D/w Cardiology to taper the meds \par - hyperkalemia and hyponatremia - ER evaluation\par \par

## 2020-07-19 NOTE — REVIEW OF SYSTEMS
[Recent Change In Weight] : ~T recent weight change [Dysphagia] : dysphagia [Diarrhea] : diarrhea [Negative] : Heme/Lymph [Fatigue] : fatigue [FreeTextEntry4] : worsening bilateral jaw pain radiating to ear; sore throat

## 2020-07-21 ENCOUNTER — RX CHANGE (OUTPATIENT)
Age: 65
End: 2020-07-21

## 2020-07-21 ENCOUNTER — APPOINTMENT (OUTPATIENT)
Dept: PODIATRY | Facility: CLINIC | Age: 65
End: 2020-07-21
Payer: MEDICARE

## 2020-07-21 VITALS
DIASTOLIC BLOOD PRESSURE: 70 MMHG | SYSTOLIC BLOOD PRESSURE: 120 MMHG | BODY MASS INDEX: 34.55 KG/M2 | WEIGHT: 215 LBS | HEIGHT: 65.98 IN

## 2020-07-21 PROCEDURE — 11055 PARING/CUTG B9 HYPRKER LES 1: CPT | Mod: Q8

## 2020-07-21 PROCEDURE — 11721 DEBRIDE NAIL 6 OR MORE: CPT | Mod: 59

## 2020-07-21 RX ORDER — METOPROLOL TARTRATE 50 MG/1
50 TABLET, FILM COATED ORAL
Refills: 0 | Status: DISCONTINUED | COMMUNITY
End: 2020-07-21

## 2020-07-21 RX ORDER — LOSARTAN POTASSIUM 100 MG/1
100 TABLET, FILM COATED ORAL
Refills: 0 | Status: DISCONTINUED | COMMUNITY
End: 2020-07-21

## 2020-07-21 NOTE — PROCEDURE
[FreeTextEntry1] : Using sterile instrumentation debridement of all nails manually and electrically to decrease thickness, pain and girth and make shoe gear more comfortable with "slant back" procedure of any bordering spicules causing pain\par \par Shaving and padding of a benign hyperkeratotic lesion\par \par A complete and thorough evaluation of the type of shoes they should be wearing and type of shoes for this time of year was discussed with patient.\par \par \par follow up appt 4 weeks\par

## 2020-07-21 NOTE — PHYSICAL EXAM
[General Appearance - Alert] : alert [General Appearance - In No Acute Distress] : in no acute distress [Affect] : the affect was normal [FreeTextEntry1] : The patient has all contributing factors to onychomycosis including but not limited to thickness, subungual debris, discoloration and partial lysis and they are brittle when cut.    Painful hyperkeratotic lesions noted sub 5 right

## 2020-07-22 ENCOUNTER — RX RENEWAL (OUTPATIENT)
Age: 65
End: 2020-07-22

## 2020-08-03 ENCOUNTER — RESULT REVIEW (OUTPATIENT)
Age: 65
End: 2020-08-03

## 2020-08-06 ENCOUNTER — RESULT CHARGE (OUTPATIENT)
Age: 65
End: 2020-08-06

## 2020-08-06 ENCOUNTER — APPOINTMENT (OUTPATIENT)
Dept: ENDOCRINOLOGY | Facility: CLINIC | Age: 65
End: 2020-08-06
Payer: MEDICARE

## 2020-08-06 ENCOUNTER — RESULT REVIEW (OUTPATIENT)
Age: 65
End: 2020-08-06

## 2020-08-06 ENCOUNTER — APPOINTMENT (OUTPATIENT)
Dept: HEMATOLOGY ONCOLOGY | Facility: CLINIC | Age: 65
End: 2020-08-06
Payer: MEDICARE

## 2020-08-06 ENCOUNTER — APPOINTMENT (OUTPATIENT)
Dept: PAIN MANAGEMENT | Facility: CLINIC | Age: 65
End: 2020-08-06
Payer: MEDICARE

## 2020-08-06 VITALS
OXYGEN SATURATION: 100 % | BODY MASS INDEX: 32.29 KG/M2 | HEART RATE: 57 BPM | RESPIRATION RATE: 18 BRPM | HEIGHT: 69 IN | SYSTOLIC BLOOD PRESSURE: 130 MMHG | DIASTOLIC BLOOD PRESSURE: 80 MMHG | WEIGHT: 218 LBS

## 2020-08-06 VITALS
HEIGHT: 65.94 IN | RESPIRATION RATE: 20 BRPM | BODY MASS INDEX: 35.51 KG/M2 | SYSTOLIC BLOOD PRESSURE: 146 MMHG | TEMPERATURE: 97.1 F | OXYGEN SATURATION: 100 % | DIASTOLIC BLOOD PRESSURE: 74 MMHG | WEIGHT: 218.3 LBS | HEART RATE: 59 BPM

## 2020-08-06 VITALS
HEIGHT: 69 IN | TEMPERATURE: 96.9 F | SYSTOLIC BLOOD PRESSURE: 156 MMHG | DIASTOLIC BLOOD PRESSURE: 62 MMHG | BODY MASS INDEX: 32.29 KG/M2 | WEIGHT: 218 LBS

## 2020-08-06 LAB
GLUCOSE BLDC GLUCOMTR-MCNC: 356
HBA1C MFR BLD HPLC: 11.4

## 2020-08-06 PROCEDURE — 99215 OFFICE O/P EST HI 40 MIN: CPT

## 2020-08-06 PROCEDURE — 99214 OFFICE O/P EST MOD 30 MIN: CPT

## 2020-08-06 PROCEDURE — 99205 OFFICE O/P NEW HI 60 MIN: CPT | Mod: 25

## 2020-08-06 PROCEDURE — G0447 BEHAVIOR COUNSEL OBESITY 15M: CPT | Mod: 59

## 2020-08-06 NOTE — HISTORY OF PRESENT ILLNESS
[___ mths] : [unfilled] month(s) ago [Paroxysmal] : paroxysmal [Sharp] : sharp [Medications] : medications [7] : a maximum pain level of 7/10 [FreeTextEntry1] : As per my  note dated 06/25/20: "63 yom w/ head and neck cancer managed by Dr. Velasquez presents w/ left sided neck and jaw pain. He has a history of alcoholism and is concerned with trying new medications. He does have relief with gabapentin which she also takes for diabetic neuropathy. Pain is worse with activity. Pain has been increasing."\par \par Pt returns for follow-up today, 08/06/20. Since his last visit he was admitted to [facility] for hypotension, unknown cause. Reports that he takes sometimes 4-5 gabapentin at a time, no side effects. He has good days and bad days with his jaw. Pain is reasonably well-controlled on the good days.  [FreeTextEntry2] : 21 [FreeTextEntry7] : left side of ear into throat [FreeTextEntry3] : n/a

## 2020-08-06 NOTE — REVIEW OF SYSTEMS
[FreeTextEntry4] : worsening bilateral jaw pain radiating to ear; sore throat  [Recent Change In Weight] : ~T no recent weight change

## 2020-08-06 NOTE — ASSESSMENT
[FreeTextEntry1] : 63 yom w/ head and neck cancer managed by Dr. Velasquez presents w/ left sided neck and jaw pain. He has a history of alcoholism and is concerned with trying new medications. He reports he take more gabpentin then prescribed. Due to his severe neuropathic pain we will increase his gabapentin to 1200 mg TID, but have discussed at length not taking more then prescribed. He will follow-up in one months time for further evaluation.

## 2020-08-06 NOTE — PHYSICAL EXAM
[de-identified] : left posterior palate - induration together with left tonsilar infiltration, Left cervical adenopathy; neck asymmetry left > right, increase left base of the neck infiltrate

## 2020-08-06 NOTE — CONSULT LETTER
[FreeTextEntry3] : Ana Velasquez MD\par [facility] [place] at [facility]\par

## 2020-08-06 NOTE — PHYSICAL EXAM
[de-identified] : Constitutional: Well-developed, in no acute distress\par Eyes: Pupil- Right: normal, Left: normal\par Neck exam: Inspection normal\par Respiratory: Normal effort, speaking in full sentences\par Cardiovascular: Regular rate and rhythm\par Vascular: Dorsal pedis pulses normal and equal bilaterally\par Abdomen: Inspection normal, no distension\par Skin: Inspection normal, no bruising noted\par Musculoskeletal:\par Cervical Spine:   \par Gait: Antalgic\par Inspection: Normal curvature, no abnormal kyphosis or scoliosis\par \par Facet loading: pain bilaterally\par \par Palpation:\par Cervical paraspinal muscles: pain bilaterally\par 		\par Muscle Strength:\par Deltoid: 5/5 bilaterally\par Biceps: 5/5 bilaterally\par Triceps: 5/5 bilaterally\par Adductor pollicis: 5/5 bilaterally\par \par Sensation: normal and equal in bilateral upper extremities\par \par Reflexes:\par Biceps (C5): 2+ bilaterally\par Brachioradialis (C6): 2+ bilaterally\par Triceps (C7): 2+ bilaterally\par \par Extremity: no edema noted\par Neurological: Memory normal, AAO x 3, Cranial nerves II - XII grossly normal\par Psychiatric: Appropriate mood and affect, oriented to time, place, person, and situation\par \par \par

## 2020-08-06 NOTE — HISTORY OF PRESENT ILLNESS
[de-identified] : 63 year old male presents today for new diagnosis of head and neck cancer, referred by Dr. Kaur, ENT.  Mr. Camacho was sent to ENT for a left neck mass with associated left tongue base.  He reported a 3 month history of constant, moderate progressive left sided throat pain with radiating otalgia.  Incidentally it was noted on neck US with vascular surgeon to have a left neck mass.  He has a long standing history of cigarette smoking 5- 6 cigarettes per day and uses alcohol in past however has quit x 5 years.  CT scan done 5/16/19 showed a 5.5cm multiloculated solid and cystic mass within the left latera; neck soft tissues.  Findings may reflect primary neoplasm versus conglomerate of left level 3 cystic/necrotic lymphadenopathy.  Additional abnormal left level 2-4 lymphadenopathy.  Nonspecific prominence of the left lingual tonsils with enhancing soft tissue involving the left oropharyngeal lateral wall.  Regional effacement of fat planes laterally involving the left submandibular gland and posteriorly involving the carotid sheath.  Mild asymmetric enlargement of the left submandibular gland.  PET CT is recommend.  He has not had a biopsy.  He has complaints of a sore throat, with left ear pain at times and new onset of headaches.  \par \par Patient was adopted so he does not know his family history [de-identified] : Patient presents today for follow up of head and neck cancer- Having episodes of vertigo off and on, began earlier this week.  Having increased pain left neck at tumor site- still coughing with episodes of blood.  \par \par Recently had Norvasc cut in half 10-5 mg by cardiologist due to hypotension/ orthostatic hypotension.

## 2020-08-06 NOTE — ASSESSMENT
[FreeTextEntry1] : 65 yo male referred by Dr. 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 Patient has palpable left cervical adenopathy. \par \par Patient offered chemo/ RT - proceeded with RT only, received 3000 cGy of 7000 cGY, stopped the treatment b/o poor tolerance in October 2019. \par \par  \par Left side of the neck - induration at the base of the neck \par Tumor p16 positive\par  Foudation One- PDL-1 1- ( positive), MS - stable, TMB- 4,  PIK3CA amplification ( alpelisib), CCND1 amplification ( abemaciclib) \par  Patient evaluated by Dr. YING Ureña - not a candidate for surgery\par - Refered to Dr. Thapa for evaluation for palliative RT - declined \par - restaging PETCT 3/2020 reviewed- locally advanced disease, with increased pain\par - Pembrolizumab - cycle 2 4/23/2020, cycle 3- 5/14/2020, cycle 4 6/4/2020, cycle 6 /16/2020, 7/16/2020\par - BP decreased - patient on Losartan/ Metoprolol, off meds - with hyperkalemia, hyponatremia \par \par 8/6/2020- progression of the disease, significant hyperkalemia\par - referred to endo - Dr. Vinson\par - plan for chemo - carbo/ taxol weekly dose in view of progression.  SIde effects explained to patient \par - when diabetes under control will rx PIQRAY\par - restaging PETCT \par \par \par

## 2020-08-10 ENCOUNTER — RX CHANGE (OUTPATIENT)
Age: 65
End: 2020-08-10

## 2020-08-13 ENCOUNTER — RESULT REVIEW (OUTPATIENT)
Age: 65
End: 2020-08-13

## 2020-08-13 ENCOUNTER — APPOINTMENT (OUTPATIENT)
Dept: HEMATOLOGY ONCOLOGY | Facility: CLINIC | Age: 65
End: 2020-08-13
Payer: MEDICARE

## 2020-08-13 VITALS
HEART RATE: 56 BPM | OXYGEN SATURATION: 100 % | TEMPERATURE: 97.7 F | WEIGHT: 223 LBS | SYSTOLIC BLOOD PRESSURE: 133 MMHG | BODY MASS INDEX: 33.03 KG/M2 | HEIGHT: 68.98 IN | RESPIRATION RATE: 18 BRPM | DIASTOLIC BLOOD PRESSURE: 70 MMHG

## 2020-08-13 PROCEDURE — 99215 OFFICE O/P EST HI 40 MIN: CPT

## 2020-08-17 NOTE — PHYSICAL EXAM
[Fully active, able to carry on all pre-disease performance without restriction] : Status 0 - Fully active, able to carry on all pre-disease performance without restriction [Normal] : affect appropriate [de-identified] : left posterior palate - induration together with left tonsilar infiltration, Left cervical adenopathy; neck asymmetry left > right, increase left base of the neck infiltrate

## 2020-08-17 NOTE — ASSESSMENT
[FreeTextEntry1] : 63 yo male referred by Dr. 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 Patient has palpable left cervical adenopathy. \par \par Patient offered chemo/ RT - agreed for RT only, received 3000 cGy of 7000 cGY, stopped the treatment b/o poor tolerance in October 2019. \par \par  Presented in April 2020 with POD -Left side of the neck - induration at the base of the neck \par Tumor p16 positive\par  Foudation One- PDL-1 1- ( positive), MS - stable, TMB- 4,  PIK3CA amplification ( alpelisib), CCND1 amplification ( abemaciclib) \par  Patient evaluated by Dr. YING Ureña - not a candidate for surgery\par - Refered to Dr. Thapa for evaluation for palliative RT - declined \par - restaging PETCT 3/2020 reviewed- locally advanced disease, with increased pain\par - Pembrolizumab - cycle 2 4/23/2020, cycle 3- 5/14/2020, cycle 4 6/4/2020, cycle 6 /16/2020, 7/16/2020\par - BP decreased - patient on Losartan/ Metoprolol, off meds - with hyperkalemia, hyponatremia \par \par 8/6/2020- progression of the disease, hyperglycemia\par - 8/13/2020 - week 1  carbo/ taxol weekly.  SIde effects explained to patient \par - pain controlled, weight stable, increased left base of the neck adenopathy, with impending ulceration, offered RT again, declined.  Explained the POD with risk of ulceration from rapidly growing cancer. \par - when diabetes under control will rx PIQRAY\par - restaging PETCT \par \par \par

## 2020-08-17 NOTE — CONSULT LETTER
[Dear  ___] : Dear  [unfilled], [Consult Letter:] : I had the pleasure of evaluating your patient, [unfilled]. [Please see my note below.] : Please see my note below. [Consult Closing:] : Thank you very much for allowing me to participate in the care of this patient.  If you have any questions, please do not hesitate to contact me. [Sincerely,] : Sincerely, [DrEvie  ___] : Dr. CHOWDARY [DrEvie ___] : Dr. CHOWDARY [FreeTextEntry3] : Ana Velasquez MD\par [facility] [place] at [facility]\par

## 2020-08-17 NOTE — CONSULT LETTER
[Consult Letter:] : I had the pleasure of evaluating your patient, [unfilled]. [Dear  ___] : Dear  [unfilled], [Please see my note below.] : Please see my note below. [Consult Closing:] : Thank you very much for allowing me to participate in the care of this patient.  If you have any questions, please do not hesitate to contact me. [Sincerely,] : Sincerely, [FreeTextEntry3] : Yisel Vinson MD

## 2020-08-17 NOTE — REVIEW OF SYSTEMS
[Fatigue] : fatigue [Dysphagia] : dysphagia [Diarrhea] : diarrhea [Negative] : Heme/Lymph [Recent Change In Weight] : ~T no recent weight change [FreeTextEntry4] : worsening bilateral jaw pain radiating to ear; sore throat

## 2020-08-17 NOTE — ASSESSMENT
[Diabetes Foot Care] : diabetes foot care [Long Term Vascular Complications] : long term vascular complications of diabetes [Carbohydrate Consistent Diet] : carbohydrate consistent diet [Importance of Diet and Exercise] : importance of diet and exercise to improve glycemic control, achieve weight loss and improve cardiovascular health [Self Monitoring of Blood Glucose] : self monitoring of blood glucose [Insulin Self-Administration] : insulin self-administration [Retinopathy Screening] : Patient was referred to ophthalmology for retinopathy screening
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
## 2020-08-20 ENCOUNTER — RESULT REVIEW (OUTPATIENT)
Age: 65
End: 2020-08-20

## 2020-08-20 ENCOUNTER — APPOINTMENT (OUTPATIENT)
Dept: ENDOCRINOLOGY | Facility: CLINIC | Age: 65
End: 2020-08-20

## 2020-08-20 ENCOUNTER — APPOINTMENT (OUTPATIENT)
Dept: HEMATOLOGY ONCOLOGY | Facility: CLINIC | Age: 65
End: 2020-08-20
Payer: MEDICARE

## 2020-08-20 VITALS
HEART RATE: 64 BPM | WEIGHT: 226.1 LBS | TEMPERATURE: 97.2 F | HEIGHT: 68.98 IN | DIASTOLIC BLOOD PRESSURE: 55 MMHG | RESPIRATION RATE: 18 BRPM | BODY MASS INDEX: 33.49 KG/M2 | OXYGEN SATURATION: 97 % | SYSTOLIC BLOOD PRESSURE: 108 MMHG

## 2020-08-20 PROCEDURE — 99214 OFFICE O/P EST MOD 30 MIN: CPT

## 2020-08-20 RX ORDER — GABAPENTIN 300 MG/1
300 CAPSULE ORAL
Qty: 180 | Refills: 3 | Status: DISCONTINUED | COMMUNITY
End: 2020-08-20

## 2020-08-20 RX ORDER — ALPELISIB 150 MG/1
2 X 150 TABLET ORAL
Qty: 1 | Refills: 2 | Status: DISCONTINUED | COMMUNITY
Start: 2020-08-06 | End: 2020-08-20

## 2020-08-20 RX ORDER — GABAPENTIN 100 MG/1
100 CAPSULE ORAL
Qty: 180 | Refills: 0 | Status: DISCONTINUED | COMMUNITY
Start: 2020-01-15 | End: 2020-08-20

## 2020-08-20 NOTE — CONSULT LETTER
[Dear  ___] : Dear  [unfilled], [Consult Letter:] : I had the pleasure of evaluating your patient, [unfilled]. [Please see my note below.] : Please see my note below. [Sincerely,] : Sincerely, [Consult Closing:] : Thank you very much for allowing me to participate in the care of this patient.  If you have any questions, please do not hesitate to contact me. [DrEvie  ___] : Dr. CHOWDARY [DrEvie ___] : Dr. CHOWDARY [FreeTextEntry3] : Ana Velasquez MD\par [facility] [place] at [facility]\par

## 2020-08-20 NOTE — HISTORY OF PRESENT ILLNESS
[Treatment Protocol] : Treatment Protocol [FreeTextEntry1] : Weekly Carbo/Taxol - started 8/13/20 - now on week 2 [de-identified] : 63 year old male presents today for new diagnosis of head and neck cancer, referred by Dr. Kaur, ENT.  Mr. Camacho was sent to ENT for a left neck mass with associated left tongue base.  He reported a 3 month history of constant, moderate progressive left sided throat pain with radiating otalgia.  Incidentally it was noted on neck US with vascular surgeon to have a left neck mass.  He has a long standing history of cigarette smoking 5- 6 cigarettes per day and uses alcohol in past however has quit x 5 years.  CT scan done 5/16/19 showed a 5.5cm multiloculated solid and cystic mass within the left latera; neck soft tissues.  Findings may reflect primary neoplasm versus conglomerate of left level 3 cystic/necrotic lymphadenopathy.  Additional abnormal left level 2-4 lymphadenopathy.  Nonspecific prominence of the left lingual tonsils with enhancing soft tissue involving the left oropharyngeal lateral wall.  Regional effacement of fat planes laterally involving the left submandibular gland and posteriorly involving the carotid sheath.  Mild asymmetric enlargement of the left submandibular gland.  PET CT is recommend.  He has not had a biopsy.  He has complaints of a sore throat, with left ear pain at times and new onset of headaches.  \par \par Patient was adopted so he does not know his family history [de-identified] : Patient was recently admitted on 8/18/20 for increased hemoptysis and enlargement of left neck mass, found to have hyperkalemia which was corrected. During his hospitalization, he was seen by surgery and ID who recommendation I&D but was not done and sent home with PO Bactrim. He did have one episode of clear discharge from the mass that resolved an hour later, wound is closed and has not had any problems since. No pain, tenderness, bleeding, dysphagia, or dysphasia. \par \par Patient has been doing well since d/c home. Denies n/v, fever, headaches, dizziness, chest pain, SOB/HAMM.  \par \par Patient still smokes a few cigarettes per day.

## 2020-08-20 NOTE — REVIEW OF SYSTEMS
[Fatigue] : fatigue [Negative] : Heme/Lymph [Recent Change In Weight] : ~T no recent weight change [FreeTextEntry4] : left neck mass

## 2020-08-20 NOTE — ASSESSMENT
[FreeTextEntry1] : 65 yo male referred by Dr. 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 Patient has palpable left cervical adenopathy. \par \par Patient offered chemo/ RT - agreed for RT only, received 3000 cGy of 7000 cGY, stopped the treatment b/o poor tolerance in October 2019. \par \par  Presented in April 2020 with POD -Left side of the neck - induration at the base of the neck \par Tumor p16 positive\par  Foudation One- PDL-1 1- ( positive), MS - stable, TMB- 4,  PIK3CA amplification ( alpelisib), CCND1 amplification ( abemaciclib) \par  Patient evaluated by Dr. YING Ureña - not a candidate for surgery\par - Refered to Dr. Thapa for evaluation for palliative RT - declined \par - restaging PETCT 3/2020 reviewed- locally advanced disease, with increased pain\par - Pembrolizumab - cycle 2 4/23/2020, cycle 3- 5/14/2020, cycle 4 6/4/2020, cycle 6 /16/2020, 7/16/2020\par - BP decreased - patient on Losartan/ Metoprolol, off meds - with hyperkalemia, hyponatremia \par \par CURRENT Treatment:\par 8/6/2020- progression of the disease, hyperglycemia\par - 8/13/2020 - week 1  carbo/ taxol weekly.  SIde effects explained to patient \par - pain controlled, weight stable, increased left base of the neck adenopathy, with impending ulceration, offered RT again, declined.  Explained the POD with risk of ulceration from rapidly growing cancer. \par - when diabetes under control will rx PIQRAY\par - restaging PETCT \par 8/20/20 - week 2 of Carb/Taxol - well tolerated - labs reviewed and discussed\par \par #smoking cessation advised and stressed.  \par \par case and management discussed with Dr. Velasquez\par rtc in 1 wk for week 3 Taxol/Carbo\par

## 2020-08-20 NOTE — PHYSICAL EXAM
[Fully active, able to carry on all pre-disease performance without restriction] : Status 0 - Fully active, able to carry on all pre-disease performance without restriction [Normal] : affect appropriate [de-identified] : left posterior palate - induration together with left tonsilar infiltration, Left cervical adenopathy; neck asymmetry left > right, increase left base of the neck infiltrate

## 2020-08-24 ENCOUNTER — RX CHANGE (OUTPATIENT)
Age: 65
End: 2020-08-24

## 2020-08-24 RX ORDER — INSULIN LISPRO 100 [IU]/ML
100 INJECTION, SOLUTION INTRAVENOUS; SUBCUTANEOUS
Qty: 2 | Refills: 0 | Status: DISCONTINUED | COMMUNITY
Start: 2020-08-06 | End: 2020-08-24

## 2020-08-27 ENCOUNTER — RESULT REVIEW (OUTPATIENT)
Age: 65
End: 2020-08-27

## 2020-08-27 ENCOUNTER — APPOINTMENT (OUTPATIENT)
Dept: HEMATOLOGY ONCOLOGY | Facility: CLINIC | Age: 65
End: 2020-08-27
Payer: MEDICARE

## 2020-08-27 VITALS
HEIGHT: 68.98 IN | WEIGHT: 237 LBS | BODY MASS INDEX: 35.1 KG/M2 | DIASTOLIC BLOOD PRESSURE: 54 MMHG | OXYGEN SATURATION: 99 % | SYSTOLIC BLOOD PRESSURE: 109 MMHG | RESPIRATION RATE: 18 BRPM | HEART RATE: 67 BPM | TEMPERATURE: 97.2 F

## 2020-08-27 PROCEDURE — 99214 OFFICE O/P EST MOD 30 MIN: CPT

## 2020-08-27 NOTE — REVIEW OF SYSTEMS
[Fatigue] : fatigue [Dysphagia] : dysphagia [Negative] : Heme/Lymph [Diarrhea] : no diarrhea [Recent Change In Weight] : ~T no recent weight change [FreeTextEntry4] : worsening bilateral jaw pain radiating to ear; sore throat  [Skin Wound] : skin wound [de-identified] : left neck with drainage

## 2020-08-27 NOTE — CONSULT LETTER
[Dear  ___] : Dear  [unfilled], [Please see my note below.] : Please see my note below. [Consult Letter:] : I had the pleasure of evaluating your patient, [unfilled]. [Sincerely,] : Sincerely, [Consult Closing:] : Thank you very much for allowing me to participate in the care of this patient.  If you have any questions, please do not hesitate to contact me. [DrEvie  ___] : Dr. CHOWDARY [DrEvie ___] : Dr. CHOWDARY [FreeTextEntry3] : Ana Velasquez MD\par [facility] [place] at [facility]\par

## 2020-08-27 NOTE — ASSESSMENT
[FreeTextEntry1] : 63 yo male referred by Dr. 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 Patient has palpable left cervical adenopathy. \par \par Patient offered chemo/ RT - agreed for RT only, received 3000 cGy of 7000 cGY, stopped the treatment b/o poor tolerance in October 2019. \par \par  Presented in April 2020 with POD -Left side of the neck - induration at the base of the neck \par Tumor p16 positive\par  Foudation One- PDL-1 1- ( positive), MS - stable, TMB- 4,  PIK3CA amplification ( alpelisib), CCND1 amplification ( abemaciclib) \par  Patient evaluated by Dr. YING Ureña - not a candidate for surgery\par - Refered to Dr. Thapa for evaluation for palliative RT - declined \par - restaging PETCT 3/2020 reviewed- locally advanced disease, with increased pain\par - Pembrolizumab - cycle 2 4/23/2020, cycle 3- 5/14/2020, cycle 4 6/4/2020, cycle 6 /16/2020, 7/16/2020\par - BP decreased - patient on Losartan/ Metoprolol, off meds - with hyperkalemia, hyponatremia \par \par 8/6/2020- progression of the disease, hyperglycemia\par - 8/13/2020 - week 1  carbo/ taxol weekly.  SIde effects explained to patient \par - pain controlled, weight stable, increased left base of the neck adenopathy, with impending ulceration, offered RT again, declined.  Explained the POD with risk of ulceration from rapidly growing cancer. \par - when diabetes under control will rx PIQRAY\par \par 8/27/2020 blood pressure (100/54) and hgb (8.5) downtrending possibly due to slow bleed, WBC 2.2 with manual ANC 1.1; patient refusing treatment as fearful of counts dropping further.  patient refusing blood transfusion at this time, but agreeable to receiving IV fluids.  BUN and creatinine increasing with hyperkalemia (5.7)\par patient told recurrent mass +/- drainage likely due to progression of disease, he continues to refuse radiation treatments\par \par Case and management reviewed with Dr. Velasquez\par Patient to return Monday 8/31/2020 for repeat bloodwork and possible transfusion and/or hydration

## 2020-08-27 NOTE — PHYSICAL EXAM
[Fully active, able to carry on all pre-disease performance without restriction] : Status 0 - Fully active, able to carry on all pre-disease performance without restriction [Normal] : normal appearance, no rash, nodules, vesicles, ulcers, erythema [de-identified] : left posterior palate - induration together with left tonsillar infiltration, Left cervical adenopathy; neck asymmetry left > right, increase left base of the neck infiltrate/tumor [de-identified] : occlusive dressing to left neck base

## 2020-08-27 NOTE — HISTORY OF PRESENT ILLNESS
[de-identified] : 63 year old male presents today for new diagnosis of head and neck cancer, referred by Dr. Kaur, ENT.  Mr. Camacho was sent to ENT for a left neck mass with associated left tongue base.  He reported a 3 month history of constant, moderate progressive left sided throat pain with radiating otalgia.  Incidentally it was noted on neck US with vascular surgeon to have a left neck mass.  He has a long standing history of cigarette smoking 5- 6 cigarettes per day and uses alcohol in past however has quit x 5 years.  CT scan done 5/16/19 showed a 5.5cm multiloculated solid and cystic mass within the left latera; neck soft tissues.  Findings may reflect primary neoplasm versus conglomerate of left level 3 cystic/necrotic lymphadenopathy.  Additional abnormal left level 2-4 lymphadenopathy.  Nonspecific prominence of the left lingual tonsils with enhancing soft tissue involving the left oropharyngeal lateral wall.  Regional effacement of fat planes laterally involving the left submandibular gland and posteriorly involving the carotid sheath.  Mild asymmetric enlargement of the left submandibular gland.  PET CT is recommend.  He has not had a biopsy.  He has complaints of a sore throat, with left ear pain at times and new onset of headaches.  \par \par Patient was adopted so he does not know his family history [de-identified] : Patient presents today for follow up of head and neck cancer.  He reports having very bloody drainage from the mass in the left side of his neck.  He believes there are two openings in the skin and the top one is leaking.  He also reports an increase in head congestion and coughing up some blood.  He denies feeling dizzy, lightheaded, SOB, palpitations, nausea, vomiting, constipation, and diarrhea. \par

## 2020-08-31 ENCOUNTER — RESULT REVIEW (OUTPATIENT)
Age: 65
End: 2020-08-31

## 2020-08-31 ENCOUNTER — APPOINTMENT (OUTPATIENT)
Dept: HEMATOLOGY ONCOLOGY | Facility: CLINIC | Age: 65
End: 2020-08-31
Payer: MEDICARE

## 2020-08-31 VITALS
TEMPERATURE: 97.2 F | WEIGHT: 233 LBS | SYSTOLIC BLOOD PRESSURE: 134 MMHG | HEIGHT: 68.98 IN | BODY MASS INDEX: 34.51 KG/M2 | HEART RATE: 63 BPM | DIASTOLIC BLOOD PRESSURE: 67 MMHG | OXYGEN SATURATION: 100 % | RESPIRATION RATE: 18 BRPM

## 2020-08-31 PROCEDURE — 99214 OFFICE O/P EST MOD 30 MIN: CPT

## 2020-08-31 RX ORDER — GABAPENTIN 800 MG/1
800 TABLET, COATED ORAL 3 TIMES DAILY
Qty: 90 | Refills: 0 | Status: DISCONTINUED | COMMUNITY
Start: 2020-06-25 | End: 2020-08-29

## 2020-08-31 RX ORDER — ATORVASTATIN CALCIUM 80 MG/1
80 TABLET, FILM COATED ORAL
Refills: 0 | Status: DISCONTINUED | COMMUNITY
End: 2020-08-29

## 2020-08-31 RX ORDER — INSULIN GLARGINE 100 [IU]/ML
100 INJECTION, SOLUTION SUBCUTANEOUS
Refills: 0 | Status: DISCONTINUED | COMMUNITY
End: 2020-08-29

## 2020-08-31 NOTE — PHYSICAL EXAM
[Fully active, able to carry on all pre-disease performance without restriction] : Status 0 - Fully active, able to carry on all pre-disease performance without restriction [Normal] : affect appropriate [de-identified] : left posterior palate - induration together with left tonsillar infiltration, Left cervical adenopathy; neck asymmetry left > right, increase left base of the neck infiltrate/tumor [de-identified] : BLE edema R>L [de-identified] : left neck base mass  increasing in size with red with erythema, cystic like to superior aspect, more firm towards inferior aspect at base; pain to palpation, no drainage noted

## 2020-08-31 NOTE — REVIEW OF SYSTEMS
[Fatigue] : fatigue [Dysphagia] : dysphagia [Skin Wound] : skin wound [Negative] : Heme/Lymph [Recent Change In Weight] : ~T no recent weight change [Diarrhea] : no diarrhea [FreeTextEntry4] : stable jaw pain radiating to ear; sore throat  [de-identified] : left neck with intermittent drainage

## 2020-08-31 NOTE — CONSULT LETTER
[Dear  ___] : Dear  [unfilled], [Consult Letter:] : I had the pleasure of evaluating your patient, [unfilled]. [Consult Closing:] : Thank you very much for allowing me to participate in the care of this patient.  If you have any questions, please do not hesitate to contact me. [Please see my note below.] : Please see my note below. [Sincerely,] : Sincerely, [DrEvie  ___] : Dr. CHOWDARY [DrEvie ___] : Dr. CHOWDARY [FreeTextEntry3] : Ana Velasquez MD\par [facility] [place] at [facility]\par

## 2020-08-31 NOTE — ASSESSMENT
[FreeTextEntry1] : 63 yo male referred by Dr. 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 Patient has palpable left cervical adenopathy. \par \par Patient offered chemo/ RT - agreed for RT only, received 3000 cGy of 7000 cGY, stopped the treatment b/o poor tolerance in October 2019. \par \par  Presented in April 2020 with POD -Left side of the neck - induration at the base of the neck \par Tumor p16 positive\par  Foudation One- PDL-1 1- ( positive), MS - stable, TMB- 4,  PIK3CA amplification ( alpelisib), CCND1 amplification ( abemaciclib) \par  Patient evaluated by Dr. YING Ureña - not a candidate for surgery\par - Refered to Dr. Thapa for evaluation for palliative RT - declined \par - restaging PETCT 3/2020 reviewed- locally advanced disease, with increased pain\par - Pembrolizumab - cycle 2 4/23/2020, cycle 3- 5/14/2020, cycle 4 6/4/2020, cycle 6 /16/2020, 7/16/2020\par - BP decreased - patient on Losartan/ Metoprolol, off meds - with hyperkalemia, hyponatremia \par \par 8/6/2020- progression of the disease, hyperglycemia\par - 8/13/2020 - week 1  carbo/ taxol weekly.  SIde effects explained to patient \par - pain controlled, weight stable, increased left base of the neck adenopathy, with impending ulceration, offered RT again, declined.  Explained the POD with risk of ulceration from rapidly growing cancer. \par - when diabetes under control will rx PIQRAY\par 8/27/2020 blood pressure (100/54) and hgb (8.5) downtrending possibly due to slow bleed, WBC 2.2 with manual ANC 1.1; patient refusing treatment as fearful of counts dropping further.  patient refusing blood transfusion at this time, but agreeable to receiving IV fluids.  BUN and creatinine increasing with hyperkalemia (5.7)\par patient told recurrent mass +/- drainage likely due to progression of disease, he continues to refuse radiation treatments\par 8/31/2020 treatment held, patient sent to ED for hyperkalemia 6.0 (last 5.7 8/27/2020), hgb 8.9, WBC 3.2, CRP increasing now 17.4\par \par Case and management reviewed with Dr. Velasquez\par Patient to return 1 week for repeat bloodwork and possible treatment

## 2020-08-31 NOTE — HISTORY OF PRESENT ILLNESS
[de-identified] : 63 year old male presents today for new diagnosis of head and neck cancer, referred by Dr. Kaur, ENT.  Mr. Camacho was sent to ENT for a left neck mass with associated left tongue base.  He reported a 3 month history of constant, moderate progressive left sided throat pain with radiating otalgia.  Incidentally it was noted on neck US with vascular surgeon to have a left neck mass.  He has a long standing history of cigarette smoking 5- 6 cigarettes per day and uses alcohol in past however has quit x 5 years.  CT scan done 5/16/19 showed a 5.5cm multiloculated solid and cystic mass within the left latera; neck soft tissues.  Findings may reflect primary neoplasm versus conglomerate of left level 3 cystic/necrotic lymphadenopathy.  Additional abnormal left level 2-4 lymphadenopathy.  Nonspecific prominence of the left lingual tonsils with enhancing soft tissue involving the left oropharyngeal lateral wall.  Regional effacement of fat planes laterally involving the left submandibular gland and posteriorly involving the carotid sheath.  Mild asymmetric enlargement of the left submandibular gland.  PET CT is recommend.  He has not had a biopsy.  He has complaints of a sore throat, with left ear pain at times and new onset of headaches.  \par \par Patient was adopted so he does not know his family history [de-identified] : Patient presents today for follow up of head and neck cancer.  He reports going to the hospital Saturday 8/29/2020 after having trouble breathing; he likely had a CHF exacerbation, symptoms resolved after receiving oxygen and lasix.  His BLE remain swollen.  He denies recurrent drainage from the mass in the left side of his neck. He denies feeling dizzy, lightheaded, SOB, palpitations, nausea, vomiting, constipation, and diarrhea. \par

## 2020-09-08 ENCOUNTER — RX RENEWAL (OUTPATIENT)
Age: 65
End: 2020-09-08

## 2020-09-09 ENCOUNTER — APPOINTMENT (OUTPATIENT)
Dept: PAIN MANAGEMENT | Facility: CLINIC | Age: 65
End: 2020-09-09
Payer: MEDICARE

## 2020-09-09 VITALS
TEMPERATURE: 97.6 F | DIASTOLIC BLOOD PRESSURE: 72 MMHG | WEIGHT: 233 LBS | BODY MASS INDEX: 35.31 KG/M2 | SYSTOLIC BLOOD PRESSURE: 136 MMHG | HEIGHT: 68 IN

## 2020-09-09 PROCEDURE — 99213 OFFICE O/P EST LOW 20 MIN: CPT

## 2020-09-09 NOTE — HISTORY OF PRESENT ILLNESS
[Paroxysmal] : paroxysmal [___ mths] : [unfilled] month(s) ago [7] : a maximum pain level of 7/10 [Sharp] : sharp [Medications] : medications [FreeTextEntry1] : As per my  note dated 06/25/20: "63 yom w/ head and neck cancer managed by Dr. Velasquez presents w/ left sided neck and jaw pain. He has a history of alcoholism and is concerned with trying new medications. He does have relief with gabapentin which she also takes for diabetic neuropathy. Pain is worse with activity. Pain has been increasing."\par \par As per my note dated, 08/06/20. "Since his last visit he was admitted to [place] for hypotension, unknown cause. Reports that he takes sometimes 4-5 gabapentin at a time, no side effects. He has good days and bad days with his jaw. Pain is reasonably well-controlled on the good days."\par \par Pt returns for follow-up today, 09/09/20. He reports that the gabapentin is helping him tremendously. Does not wish to change medications.  [FreeTextEntry2] : 21 [FreeTextEntry3] : n/a [FreeTextEntry7] : left side of ear into throat

## 2020-09-09 NOTE — PHYSICAL EXAM
[de-identified] : Constitutional: Well-developed, in no acute distress\par Eyes: Pupil- Right: normal, Left: normal\par Neck exam: Inspection normal\par Respiratory: Normal effort, speaking in full sentences\par Cardiovascular: Regular rate and rhythm\par Vascular: Dorsal pedis pulses normal and equal bilaterally\par Abdomen: Inspection normal, no distension\par Skin: Inspection normal, no bruising noted\par Musculoskeletal:\par Cervical Spine:   \par Gait: Antalgic\par Inspection: Normal curvature, no abnormal kyphosis or scoliosis\par \par Facet loading: pain bilaterally\par \par Palpation:\par Cervical paraspinal muscles: pain bilaterally\par 		\par Muscle Strength:\par Deltoid: 5/5 bilaterally\par Biceps: 5/5 bilaterally\par Triceps: 5/5 bilaterally\par Adductor pollicis: 5/5 bilaterally\par \par Sensation: normal and equal in bilateral upper extremities\par \par Reflexes:\par Biceps (C5): 2+ bilaterally\par Brachioradialis (C6): 2+ bilaterally\par Triceps (C7): 2+ bilaterally\par \par Extremity: no edema noted\par Neurological: Memory normal, AAO x 3, Cranial nerves II - XII grossly normal\par Psychiatric: Appropriate mood and affect, oriented to time, place, person, and situation\par \par \par

## 2020-09-09 NOTE — ASSESSMENT
[FreeTextEntry1] : 64 yom w/ head and neck cancer managed by Dr. Velasquez presents w/ left sided neck and jaw pain. He has a history of alcoholism and is concerned with trying new medications. He is doing very well with gabapentin to 1200 mg TID. Continue for now. RTC one month.

## 2020-09-10 ENCOUNTER — RESULT REVIEW (OUTPATIENT)
Age: 65
End: 2020-09-10

## 2020-09-10 ENCOUNTER — APPOINTMENT (OUTPATIENT)
Dept: HEMATOLOGY ONCOLOGY | Facility: CLINIC | Age: 65
End: 2020-09-10
Payer: MEDICARE

## 2020-09-10 VITALS
TEMPERATURE: 97 F | RESPIRATION RATE: 18 BRPM | OXYGEN SATURATION: 100 % | WEIGHT: 225.9 LBS | HEART RATE: 55 BPM | HEIGHT: 67.99 IN | SYSTOLIC BLOOD PRESSURE: 140 MMHG | BODY MASS INDEX: 34.24 KG/M2 | DIASTOLIC BLOOD PRESSURE: 59 MMHG

## 2020-09-10 PROCEDURE — 99214 OFFICE O/P EST MOD 30 MIN: CPT

## 2020-09-10 RX ORDER — PEN NEEDLE, DIABETIC 29 G X1/2"
32G X 4 MM NEEDLE, DISPOSABLE MISCELLANEOUS
Qty: 100 | Refills: 0 | Status: ACTIVE | COMMUNITY
Start: 2020-07-31

## 2020-09-10 NOTE — PHYSICAL EXAM
[Fully active, able to carry on all pre-disease performance without restriction] : Status 0 - Fully active, able to carry on all pre-disease performance without restriction [Normal] : affect appropriate [de-identified] : left posterior palate - induration together with left tonsillar infiltration, Left cervical adenopathy; neck asymmetry left > right, increase left base of the neck infiltrate/tumor [de-identified] : BLE edema R>L, improving [de-identified] : left neck base mass decreasing in size with red with less erythema, cystic like to superior aspect, less firm, no pain to palpation, no drainage noted

## 2020-09-10 NOTE — HISTORY OF PRESENT ILLNESS
[de-identified] : 63 year old male presents today for new diagnosis of head and neck cancer, referred by Dr. Kaur, ENT.  Mr. Camacho was sent to ENT for a left neck mass with associated left tongue base.  He reported a 3 month history of constant, moderate progressive left sided throat pain with radiating otalgia.  Incidentally it was noted on neck US with vascular surgeon to have a left neck mass.  He has a long standing history of cigarette smoking 5- 6 cigarettes per day and uses alcohol in past however has quit x 5 years.  CT scan done 5/16/19 showed a 5.5cm multiloculated solid and cystic mass within the left latera; neck soft tissues.  Findings may reflect primary neoplasm versus conglomerate of left level 3 cystic/necrotic lymphadenopathy.  Additional abnormal left level 2-4 lymphadenopathy.  Nonspecific prominence of the left lingual tonsils with enhancing soft tissue involving the left oropharyngeal lateral wall.  Regional effacement of fat planes laterally involving the left submandibular gland and posteriorly involving the carotid sheath.  Mild asymmetric enlargement of the left submandibular gland.  PET CT is recommend.  He has not had a biopsy.  He has complaints of a sore throat, with left ear pain at times and new onset of headaches.  \par \par Patient was adopted so he does not know his family history [de-identified] : Patient presents today for follow up of head and neck cancer.  He reports feeling well and he thinks his neck wound is betting smaller.  He reports small amount of bloody drainage, but nothing compared to how the neck wound was previously draining.  He denies pain and tenderness to the site.  He denies feeling dizzy, lightheaded, SOB, palpitations, nausea, vomiting, constipation, and diarrhea.  He reports having a cough at night.  \par \par

## 2020-09-10 NOTE — CONSULT LETTER
[Consult Letter:] : I had the pleasure of evaluating your patient, [unfilled]. [Dear  ___] : Dear  [unfilled], [Please see my note below.] : Please see my note below. [Consult Closing:] : Thank you very much for allowing me to participate in the care of this patient.  If you have any questions, please do not hesitate to contact me. [DrEvie  ___] : Dr. CHOWDARY [Sincerely,] : Sincerely, [DrEvie ___] : Dr. CHOWDARY [FreeTextEntry3] : Ana Velasquez MD\par [facility] [place] at [facility]\par

## 2020-09-10 NOTE — ASSESSMENT
[FreeTextEntry1] : 65 yo male referred by Dr. 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 Patient has palpable left cervical adenopathy. \par \par Patient offered chemo/ RT - agreed for RT only, received 3000 cGy of 7000 cGY, stopped the treatment b/o poor tolerance in October 2019. \par \par  Presented in April 2020 with POD -Left side of the neck - induration at the base of the neck \par Tumor p16 positive\par  Foudation One- PDL-1 1- ( positive), MS - stable, TMB- 4,  PIK3CA amplification ( alpelisib), CCND1 amplification ( abemaciclib) \par  Patient evaluated by Dr. YING Ureña - not a candidate for surgery\par - Refered to Dr. Thapa for evaluation for palliative RT - declined \par - restaging PETCT 3/2020 reviewed- locally advanced disease, with increased pain\par - Pembrolizumab - cycle 2 4/23/2020, cycle 3- 5/14/2020, cycle 4 6/4/2020, cycle 6 /16/2020, 7/16/2020\par - BP decreased - patient on Losartan/ Metoprolol, off meds - with hyperkalemia, hyponatremia \par \par 8/6/2020- progression of the disease, hyperglycemia\par - 8/13/2020 - week 1  carbo/ taxol weekly.  SIde effects explained to patient \par - pain controlled, weight stable, increased left base of the neck adenopathy, with impending ulceration, offered RT again, declined.  Explained the POD with risk of ulceration from rapidly growing cancer. \par - when diabetes under control will rx PIQRAY\par 8/27/2020 blood pressure (100/54) and hgb (8.5) downtrending possibly due to slow bleed, WBC 2.2 with manual ANC 1.1; patient refusing treatment as fearful of counts dropping further.  patient refusing blood transfusion at this time, but agreeable to receiving IV fluids.  BUN and creatinine increasing with hyperkalemia (5.7)\par patient told recurrent mass +/- drainage likely due to progression of disease, he continues to refuse radiation treatments\par 8/31/2020 treatment held, patient sent to ED for hyperkalemia 6.0 (last 5.7 8/27/2020), hgb 8.9, WBC 3.2, CRP increasing now 17.4\par 9/10/2020 taxo/carbo weekly; referral to see Dr. Pina for ongoing hyperkalemia and worsening renal function\par \par Case and management reviewed with Dr. Velasquez\par Next visit 1 week for bloodwork and treatment

## 2020-09-10 NOTE — REVIEW OF SYSTEMS
[Fatigue] : fatigue [Skin Wound] : skin wound [Negative] : Heme/Lymph [Dysphagia] : no dysphagia [Recent Change In Weight] : ~T no recent weight change [de-identified] : left neck with intermittent drainage [Diarrhea] : no diarrhea

## 2020-09-17 ENCOUNTER — RESULT REVIEW (OUTPATIENT)
Age: 65
End: 2020-09-17

## 2020-09-17 ENCOUNTER — APPOINTMENT (OUTPATIENT)
Dept: CARDIOLOGY | Facility: CLINIC | Age: 65
End: 2020-09-17
Payer: MEDICARE

## 2020-09-17 ENCOUNTER — NON-APPOINTMENT (OUTPATIENT)
Age: 65
End: 2020-09-17

## 2020-09-17 ENCOUNTER — APPOINTMENT (OUTPATIENT)
Dept: HEMATOLOGY ONCOLOGY | Facility: CLINIC | Age: 65
End: 2020-09-17
Payer: MEDICARE

## 2020-09-17 VITALS
HEIGHT: 70 IN | SYSTOLIC BLOOD PRESSURE: 140 MMHG | WEIGHT: 225 LBS | DIASTOLIC BLOOD PRESSURE: 70 MMHG | BODY MASS INDEX: 32.21 KG/M2

## 2020-09-17 VITALS
WEIGHT: 223 LBS | HEIGHT: 67.99 IN | HEART RATE: 73 BPM | OXYGEN SATURATION: 100 % | RESPIRATION RATE: 18 BRPM | DIASTOLIC BLOOD PRESSURE: 69 MMHG | SYSTOLIC BLOOD PRESSURE: 114 MMHG | BODY MASS INDEX: 33.8 KG/M2 | TEMPERATURE: 97.3 F

## 2020-09-17 PROCEDURE — 99204 OFFICE O/P NEW MOD 45 MIN: CPT

## 2020-09-17 PROCEDURE — 99214 OFFICE O/P EST MOD 30 MIN: CPT

## 2020-09-17 PROCEDURE — 93000 ELECTROCARDIOGRAM COMPLETE: CPT

## 2020-09-17 NOTE — ASSESSMENT
[FreeTextEntry1] : 65 yo male referred by Dr. 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 Patient has palpable left cervical adenopathy. \par \par Patient offered chemo/ RT - agreed for RT only, received 3000 cGy of 7000 cGY, stopped the treatment b/o poor tolerance in October 2019. \par \par  Presented in April 2020 with POD -Left side of the neck - induration at the base of the neck \par Tumor p16 positive\par  Foudation One- PDL-1 1- ( positive), MS - stable, TMB- 4,  PIK3CA amplification ( alpelisib), CCND1 amplification ( abemaciclib) \par  Patient evaluated by Dr. YING Ureña - not a candidate for surgery\par - Refered to Dr. Thapa for evaluation for palliative RT - declined \par - restaging PETCT 3/2020 reviewed- locally advanced disease, with increased pain\par - Pembrolizumab - cycle 2 4/23/2020, cycle 3- 5/14/2020, cycle 4 6/4/2020, cycle 6 /16/2020, 7/16/2020\par - BP decreased - patient on Losartan/ Metoprolol, off meds - with hyperkalemia, hyponatremia \par \par 8/6/2020- progression of the disease, hyperglycemia\par - 8/13/2020 - week 1  carbo/ taxol weekly.  SIde effects explained to patient \par - pain controlled, weight stable, increased left base of the neck adenopathy, with impending ulceration, offered RT again, declined.  Explained the POD with risk of ulceration from rapidly growing cancer. \par - when diabetes under control will rx PIQRAY\par 8/27/2020 blood pressure (100/54) and hgb (8.5) downtrending possibly due to slow bleed, WBC 2.2 with manual ANC 1.1; patient refusing treatment as fearful of counts dropping further.  patient refusing blood transfusion at this time, but agreeable to receiving IV fluids.  BUN and creatinine increasing with hyperkalemia (5.7)\par patient told recurrent mass +/- drainage likely due to progression of disease, he continues to refuse radiation treatments\par 8/31/2020 treatment held, patient sent to ED for hyperkalemia 6.0 (last 5.7 8/27/2020), hgb 8.9, WBC 3.2, CRP increasing now 17.4\par 9/10/2020 taxo/carbo weekly; referral to see Dr. Pian for ongoing hyperkalemia and worsening renal function\par 9/17/2020 taxo/carbo continues, left neck mass responding to treatment, patient needs to see Dr. Pina renal function remains compromised and hyperkalemia (5.8) continues\par \par Case and management reviewed with Dr. Velasquez\par Next visit 1 week for bloodwork and treatment

## 2020-09-17 NOTE — CONSULT LETTER
[Dear  ___] : Dear  [unfilled], [Consult Letter:] : I had the pleasure of evaluating your patient, [unfilled]. [Please see my note below.] : Please see my note below. [Consult Closing:] : Thank you very much for allowing me to participate in the care of this patient.  If you have any questions, please do not hesitate to contact me. [Sincerely,] : Sincerely, [DrEvie  ___] : Dr. CHOWDARY [DrEvie ___] : Dr. CHOWDARY [FreeTextEntry3] : Ana Velasquez MD\par [facility] [place] at [facility]\par

## 2020-09-17 NOTE — HISTORY OF PRESENT ILLNESS
[de-identified] : 63 year old male presents today for new diagnosis of head and neck cancer, referred by Dr. Kaur, ENT.  Mr. Camacho was sent to ENT for a left neck mass with associated left tongue base.  He reported a 3 month history of constant, moderate progressive left sided throat pain with radiating otalgia.  Incidentally it was noted on neck US with vascular surgeon to have a left neck mass.  He has a long standing history of cigarette smoking 5- 6 cigarettes per day and uses alcohol in past however has quit x 5 years.  CT scan done 5/16/19 showed a 5.5cm multiloculated solid and cystic mass within the left latera; neck soft tissues.  Findings may reflect primary neoplasm versus conglomerate of left level 3 cystic/necrotic lymphadenopathy.  Additional abnormal left level 2-4 lymphadenopathy.  Nonspecific prominence of the left lingual tonsils with enhancing soft tissue involving the left oropharyngeal lateral wall.  Regional effacement of fat planes laterally involving the left submandibular gland and posteriorly involving the carotid sheath.  Mild asymmetric enlargement of the left submandibular gland.  PET CT is recommend.  He has not had a biopsy.  He has complaints of a sore throat, with left ear pain at times and new onset of headaches.  \par \par Patient was adopted so he does not know his family history [de-identified] : Patient presents today for follow up of head and neck cancer.  He reports feeling well and reports his neck wound is getting smaller and hasn't had any discharge.  He denies pain and tenderness to the site.  He denies feeling dizzy, lightheaded, SOB, palpitations, nausea, vomiting, constipation, and diarrhea.  He reports having a cough at night.  \par \par

## 2020-09-17 NOTE — PHYSICAL EXAM
[Fully active, able to carry on all pre-disease performance without restriction] : Status 0 - Fully active, able to carry on all pre-disease performance without restriction [Normal] : affect appropriate [de-identified] : left posterior palate - induration together with left tonsillar infiltration, Left cervical adenopathy; improving [de-identified] : BLE edema R>L, improving [de-identified] : left neck base mass significant decrease in size with red with less erythema, cystic like to superior aspect, less firm, no pain to palpation, no drainage noted

## 2020-09-17 NOTE — REVIEW OF SYSTEMS
[Fatigue] : fatigue [Lower Ext Edema] : lower extremity edema [Skin Wound] : skin wound [Negative] : Heme/Lymph [Recent Change In Weight] : ~T no recent weight change [Dysphagia] : no dysphagia [Diarrhea] : no diarrhea [FreeTextEntry5] : improving [de-identified] : left neck no drainage

## 2020-09-18 NOTE — ASSESSMENT
[FreeTextEntry1] : EKG September 2020 sinus rhythm first degree AV block nonspecific ST abnormality, old inferior infarct with T wave inversion consider lateral ischemia.\par \par heart failure and cardiac history\par \par 2d echo and pharm stress test. \par \par Continue aspirin daily\par Recommend smoking cessation

## 2020-09-18 NOTE — HISTORY OF PRESENT ILLNESS
[FreeTextEntry1] : 64-year-old male with recurrent HPV head and neck cancer initially diagnosed in May 2019 with worsening pain and neck swelling tightness or tongue cancer in May 2019 offered chemotherapy and radiation patient was not deemed a candidate for surgery. Patient is a history of acute congestive heart failure associated with acute hypoxemic respiratory failure and diabetes also has a history of coronary artery disease and has had surgery in the past - Patient unable to provide clear history. \par  he also has a history of bilateral carotid endarterectomy.  He was most recently at the hospital August 2020 for acute heart failure exacerbation

## 2020-09-18 NOTE — PHYSICAL EXAM
[General Appearance - Well Developed] : well developed [Normal Appearance] : normal appearance [Well Groomed] : well groomed [General Appearance - Well Nourished] : well nourished [No Deformities] : no deformities [General Appearance - In No Acute Distress] : no acute distress [Normal Conjunctiva] : the conjunctiva exhibited no abnormalities [Eyelids - No Xanthelasma] : the eyelids demonstrated no xanthelasmas [Normal Oral Mucosa] : normal oral mucosa [No Oral Pallor] : no oral pallor [No Oral Cyanosis] : no oral cyanosis [Normal Jugular Venous A Waves Present] : normal jugular venous A waves present [Normal Jugular Venous V Waves Present] : normal jugular venous V waves present [No Jugular Venous Broderick A Waves] : no jugular venous broderick A waves [Respiration, Rhythm And Depth] : normal respiratory rhythm and effort [Exaggerated Use Of Accessory Muscles For Inspiration] : no accessory muscle use [Auscultation Breath Sounds / Voice Sounds] : lungs were clear to auscultation bilaterally [Heart Rate And Rhythm] : heart rate and rhythm were normal [Heart Sounds] : normal S1 and S2 [Murmurs] : no murmurs present [Abdomen Soft] : soft [Abdomen Tenderness] : non-tender [Abdomen Mass (___ Cm)] : no abdominal mass palpated [Abnormal Walk] : normal gait [Gait - Sufficient For Exercise Testing] : the gait was sufficient for exercise testing [Nail Clubbing] : no clubbing of the fingernails [Cyanosis, Localized] : no localized cyanosis [Petechial Hemorrhages (___cm)] : no petechial hemorrhages [Skin Color & Pigmentation] : normal skin color and pigmentation [] : no rash [No Venous Stasis] : no venous stasis [Skin Lesions] : no skin lesions [No Skin Ulcers] : no skin ulcer [No Xanthoma] : no  xanthoma was observed [Oriented To Time, Place, And Person] : oriented to person, place, and time [Affect] : the affect was normal [Mood] : the mood was normal [No Anxiety] : not feeling anxious [FreeTextEntry1] : 1-2+ edema

## 2020-09-24 ENCOUNTER — RESULT REVIEW (OUTPATIENT)
Age: 65
End: 2020-09-24

## 2020-09-24 ENCOUNTER — APPOINTMENT (OUTPATIENT)
Dept: PODIATRY | Facility: CLINIC | Age: 65
End: 2020-09-24
Payer: MEDICARE

## 2020-09-24 ENCOUNTER — APPOINTMENT (OUTPATIENT)
Dept: HEMATOLOGY ONCOLOGY | Facility: CLINIC | Age: 65
End: 2020-09-24
Payer: MEDICARE

## 2020-09-24 ENCOUNTER — NON-APPOINTMENT (OUTPATIENT)
Age: 65
End: 2020-09-24

## 2020-09-24 VITALS
SYSTOLIC BLOOD PRESSURE: 103 MMHG | RESPIRATION RATE: 18 BRPM | HEART RATE: 58 BPM | OXYGEN SATURATION: 98 % | DIASTOLIC BLOOD PRESSURE: 60 MMHG | BODY MASS INDEX: 31.75 KG/M2 | HEIGHT: 70 IN | WEIGHT: 221.8 LBS | TEMPERATURE: 96.6 F

## 2020-09-24 VITALS
BODY MASS INDEX: 31.64 KG/M2 | WEIGHT: 221 LBS | HEIGHT: 70 IN | DIASTOLIC BLOOD PRESSURE: 60 MMHG | SYSTOLIC BLOOD PRESSURE: 103 MMHG

## 2020-09-24 DIAGNOSIS — M79.89 OTHER SPECIFIED SOFT TISSUE DISORDERS: ICD-10-CM

## 2020-09-24 DIAGNOSIS — I20.8 OTHER FORMS OF ANGINA PECTORIS: ICD-10-CM

## 2020-09-24 PROCEDURE — 99214 OFFICE O/P EST MOD 30 MIN: CPT

## 2020-09-24 PROCEDURE — 11055 PARING/CUTG B9 HYPRKER LES 1: CPT | Mod: Q8

## 2020-09-24 PROCEDURE — 11721 DEBRIDE NAIL 6 OR MORE: CPT | Mod: 59

## 2020-09-24 NOTE — CONSULT LETTER
[Dear  ___] : Dear  [unfilled], [Consult Letter:] : I had the pleasure of evaluating your patient, [unfilled]. [Please see my note below.] : Please see my note below. [Consult Closing:] : Thank you very much for allowing me to participate in the care of this patient.  If you have any questions, please do not hesitate to contact me. [Sincerely,] : Sincerely, [DrEvie  ___] : Dr. CHOWDARY [DrEvie ___] : Dr. CHOWDARY [FreeTextEntry3] : Ana Velasquez MD\par [facility] [place] at [facility]\par

## 2020-09-24 NOTE — REVIEW OF SYSTEMS
[Fatigue] : fatigue [Lower Ext Edema] : lower extremity edema [Skin Wound] : skin wound [Negative] : Heme/Lymph [Recent Change In Weight] : ~T no recent weight change [Dysphagia] : no dysphagia [Diarrhea] : no diarrhea [FreeTextEntry2] : worsening [FreeTextEntry5] : improving [de-identified] : left neck no drainage, getting smaller

## 2020-09-24 NOTE — PHYSICAL EXAM
[FreeTextEntry1] : The patient has all contributing factors to onychomycosis including but not limited to thickness, subungual debris, discoloration and partial lysis and they are brittle when cut.    Painful hyperkeratotic lesions noted sub 5 right

## 2020-09-24 NOTE — HISTORY OF PRESENT ILLNESS
[de-identified] : 63 year old male presents today for new diagnosis of head and neck cancer, referred by Dr. Kaur, ENT.  Mr. Camacho was sent to ENT for a left neck mass with associated left tongue base.  He reported a 3 month history of constant, moderate progressive left sided throat pain with radiating otalgia.  Incidentally it was noted on neck US with vascular surgeon to have a left neck mass.  He has a long standing history of cigarette smoking 5- 6 cigarettes per day and uses alcohol in past however has quit x 5 years.  CT scan done 5/16/19 showed a 5.5cm multiloculated solid and cystic mass within the left latera; neck soft tissues.  Findings may reflect primary neoplasm versus conglomerate of left level 3 cystic/necrotic lymphadenopathy.  Additional abnormal left level 2-4 lymphadenopathy.  Nonspecific prominence of the left lingual tonsils with enhancing soft tissue involving the left oropharyngeal lateral wall.  Regional effacement of fat planes laterally involving the left submandibular gland and posteriorly involving the carotid sheath.  Mild asymmetric enlargement of the left submandibular gland.  PET CT is recommend.  He has not had a biopsy.  He has complaints of a sore throat, with left ear pain at times and new onset of headaches.  \par \par Patient was adopted so he does not know his family history [de-identified] : Patient presents today for follow up of head and neck cancer.  He reports feeling increasingly weak and more tired completing his normal activities.  He also reports developing intermittent chest pain that comes and goes; it does not coincide with activities or rest.  He saw Dr. Olson 9/17/2020 who reprotes 1st degreee AV block with ST abnormality- pending repeat ECHO and stress test. \par His left neck is feeling better; denies pain and tenderness to the site.  He denies feeling dizzy, lightheaded, SOB, palpitations, nausea, vomiting, constipation, and diarrhea.  \par \par

## 2020-09-24 NOTE — ASSESSMENT
[FreeTextEntry1] : 65 yo male referred by Dr. 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 Patient has palpable left cervical adenopathy. \par \par Patient offered chemo/ RT - agreed for RT only, received 3000 cGy of 7000 cGY, stopped the treatment b/o poor tolerance in October 2019. \par \par  Presented in April 2020 with POD -Left side of the neck - induration at the base of the neck \par Tumor p16 positive\par  Foudation One- PDL-1 1- ( positive), MS - stable, TMB- 4,  PIK3CA amplification ( alpelisib), CCND1 amplification ( abemaciclib) \par  Patient evaluated by Dr. YING Ureña - not a candidate for surgery\par - Refered to Dr. Thapa for evaluation for palliative RT - declined \par - restaging PETCT 3/2020 reviewed- locally advanced disease, with increased pain\par - Pembrolizumab - cycle 2 4/23/2020, cycle 3- 5/14/2020, cycle 4 6/4/2020, cycle 6 /16/2020, 7/16/2020\par - BP decreased - patient on Losartan/ Metoprolol, off meds - with hyperkalemia, hyponatremia \par \par 8/6/2020- progression of the disease, hyperglycemia\par - 8/13/2020 - week 1  carbo/ taxol weekly.  SIde effects explained to patient \par - pain controlled, weight stable, increased left base of the neck adenopathy, with impending ulceration, offered RT again, declined.  Explained the POD with risk of ulceration from rapidly growing cancer. \par - when diabetes under control will rx PIQRAY\par 8/27/2020 blood pressure (100/54) and hgb (8.5) downtrending possibly due to slow bleed, WBC 2.2 with manual ANC 1.1; patient refusing treatment as fearful of counts dropping further.  patient refusing blood transfusion at this time, but agreeable to receiving IV fluids.  BUN and creatinine increasing with hyperkalemia (5.7)\par patient told recurrent mass +/- drainage likely due to progression of disease, he continues to refuse radiation treatments\par 8/31/2020 treatment held, patient sent to ED for hyperkalemia 6.0 (last 5.7 8/27/2020), hgb 8.9, WBC 3.2, CRP increasing now 17.4\par 9/10/2020 taxo/carbo weekly; referral to see Dr. Pina for ongoing hyperkalemia and worsening renal function\par 9/17/2020 taxo/carbo #4 continues, left neck mass responding to treatment, patient needs to see Dr. Pina renal function remains compromised and hyperkalemia (5.8) continues\par 9/24/2020 treatment held due to cardiac symptoms, troponin checked (27) has been elevated previously, Dr Olson notified of symptoms and patient told to call his office should the angina worsen.  Weakness may be cardiac related, blood pressure also slightly lower than normal; need to follow up with Dr. Pina\par \par Case and management reviewed with Dr. Velasquez\par Next visit 1 week for bloodwork and treatment

## 2020-09-24 NOTE — ASSESSMENT
[FreeTextEntry1] : Using sterile instrumentation debridement of all nails manually and electrically to decrease thickness, pain and girth and make shoe gear more comfortable with "slant back" procedure of any bordering spicules causing pain\par \par Shaving and padding of a benign hyperkeratotic lesion\par A complete and thorough evaluation of the type of shoes they should be wearing and type of shoes for this time of year was discussed with patient.\par \par

## 2020-09-24 NOTE — PHYSICAL EXAM
[Fully active, able to carry on all pre-disease performance without restriction] : Status 0 - Fully active, able to carry on all pre-disease performance without restriction [Normal] : affect appropriate [de-identified] : left posterior palate - induration together with left tonsillar infiltration, Left cervical adenopathy; improving [de-identified] : BLE edema R>L, improving [de-identified] : left neck base mass significantly decreased in size with red with less erythema, less cystic in appearance, less firm, no pain to palpation, no drainage noted

## 2020-09-28 ENCOUNTER — RESULT REVIEW (OUTPATIENT)
Age: 65
End: 2020-09-28

## 2020-10-01 ENCOUNTER — APPOINTMENT (OUTPATIENT)
Dept: HEMATOLOGY ONCOLOGY | Facility: CLINIC | Age: 65
End: 2020-10-01

## 2020-10-08 ENCOUNTER — RESULT REVIEW (OUTPATIENT)
Age: 65
End: 2020-10-08

## 2020-10-08 ENCOUNTER — APPOINTMENT (OUTPATIENT)
Dept: HEMATOLOGY ONCOLOGY | Facility: CLINIC | Age: 65
End: 2020-10-08
Payer: MEDICARE

## 2020-10-08 VITALS
BODY MASS INDEX: 31.61 KG/M2 | HEIGHT: 70 IN | WEIGHT: 220.8 LBS | OXYGEN SATURATION: 100 % | RESPIRATION RATE: 18 BRPM | HEART RATE: 53 BPM | SYSTOLIC BLOOD PRESSURE: 153 MMHG | TEMPERATURE: 97.1 F | DIASTOLIC BLOOD PRESSURE: 56 MMHG

## 2020-10-08 PROCEDURE — 99215 OFFICE O/P EST HI 40 MIN: CPT

## 2020-10-09 ENCOUNTER — APPOINTMENT (OUTPATIENT)
Dept: NEPHROLOGY | Facility: CLINIC | Age: 65
End: 2020-10-09
Payer: MEDICARE

## 2020-10-09 VITALS
SYSTOLIC BLOOD PRESSURE: 140 MMHG | HEIGHT: 70 IN | TEMPERATURE: 96.3 F | OXYGEN SATURATION: 98 % | DIASTOLIC BLOOD PRESSURE: 60 MMHG | BODY MASS INDEX: 30.78 KG/M2 | HEART RATE: 66 BPM | WEIGHT: 215 LBS

## 2020-10-09 DIAGNOSIS — N25.89 OTHER DISORDERS RESULTING FROM IMPAIRED RENAL TUBULAR FUNCTION: ICD-10-CM

## 2020-10-09 DIAGNOSIS — K59.09 OTHER CONSTIPATION: ICD-10-CM

## 2020-10-09 PROCEDURE — 99204 OFFICE O/P NEW MOD 45 MIN: CPT

## 2020-10-09 NOTE — CONSULT LETTER
[Dear  ___] : Dear  [unfilled], [Consult Letter:] : I had the pleasure of evaluating your patient, [unfilled]. [Please see my note below.] : Please see my note below. [Consult Closing:] : Thank you very much for allowing me to participate in the care of this patient.  If you have any questions, please do not hesitate to contact me. [Sincerely,] : Sincerely, [DrEvie  ___] : Dr. CHOWDARY [DrEvie ___] : Dr. CHOWDARY [FreeTextEntry3] : Ana Velasquez MD\par [facility] [place] at [facility]\par

## 2020-10-09 NOTE — REVIEW OF SYSTEMS
[Fatigue] : fatigue [Lower Ext Edema] : lower extremity edema [Skin Wound] : skin wound [Negative] : Heme/Lymph [Recent Change In Weight] : ~T no recent weight change [Dysphagia] : no dysphagia [Diarrhea] : no diarrhea [FreeTextEntry2] : worsening [FreeTextEntry5] : improving [de-identified] : left neck no drainage, getting smaller

## 2020-10-09 NOTE — ASSESSMENT
[FreeTextEntry1] : 63 yo male referred by Dr. 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 Patient has palpable left cervical adenopathy. \par \par Patient offered chemo/ RT - agreed for RT only, received 3000 cGy of 7000 cGY, stopped the treatment b/o poor tolerance in October 2019. \par \par  Presented in April 2020 with POD -Left side of the neck - induration at the base of the neck \par Tumor p16 positive\par  Foudation One- PDL-1 1- ( positive), MS - stable, TMB- 4,  PIK3CA amplification ( alpelisib), CCND1 amplification ( abemaciclib) \par  Patient evaluated by Dr. YING Ureña - not a candidate for surgery\par - Refered to Dr. Thapa for evaluation for palliative RT - declined \par - restaging PETCT 3/2020 reviewed- locally advanced disease, with increased pain\par - Pembrolizumab - cycle 2 4/23/2020, cycle 3- 5/14/2020, cycle 4 6/4/2020, cycle 6 /16/2020, 7/16/2020\par - BP decreased - patient on Losartan/ Metoprolol, off meds - with hyperkalemia, hyponatremia \par \par 8/6/2020- progression of the disease, hyperglycemia\par - 8/13/2020 - week 1  carbo/ taxol weekly.  SIde effects explained to patient \par - pain controlled, weight stable, increased left base of the neck adenopathy, with impending ulceration, offered RT again, declined.  Explained the POD with risk of ulceration from rapidly growing cancer. \par - when diabetes under control will rx PIQRAY\par 8/27/2020 blood pressure (100/54) and hgb (8.5) downtrending possibly due to slow bleed, WBC 2.2 with manual ANC 1.1; patient refusing treatment as fearful of counts dropping further.  patient refusing blood transfusion at this time, but agreeable to receiving IV fluids.  BUN and creatinine increasing with hyperkalemia (5.7)\par patient told recurrent mass +/- drainage likely due to progression of disease, he continues to refuse radiation treatments\par 8/31/2020 treatment held, patient sent to ED for hyperkalemia 6.0 (last 5.7 8/27/2020), hgb 8.9, WBC 3.2, CRP increasing now 17.4\par 9/10/2020 taxo/carbo weekly; referral to see Dr. Pina for ongoing hyperkalemia and worsening renal function\par 9/17/2020 taxo/carbo #4 continues, left neck mass responding to treatment, patient needs to see Dr. Pina renal function remains compromised and hyperkalemia (5.8) continues\par 9/24/2020 treatment held due to cardiac symptoms, troponin checked (27) has been elevated previously, Dr Olson notified of symptoms and patient told to call his office should the angina worsen.  Weakness may be cardiac related, blood pressure also slightly lower than normal; need to follow up with Dr. Pina\par 10/9 /2020  Feels better, decreased mass at the base of the neck.  Stable PO Intake.  Undergoing cardiology evaluation.  Hyperkalemia- repeat testing today. carbo/ taxol weekly day 1 today.

## 2020-10-09 NOTE — REVIEW OF SYSTEMS
[Constipation] : constipation [Proptosis] : proptosis [Fever] : no fever [Chills] : no chills [Eye Pain] : no eye pain [Red Eyes] : eyes not red [Earache] : no earache [Loss Of Hearing] : no hearing loss [Heart Rate Is Slow] : the heart rate was not slow [Heart Rate Is Fast] : the heart rate was not fast [Shortness Of Breath] : no shortness of breath [Wheezing] : no wheezing [Abdominal Pain] : no abdominal pain [Vomiting] : no vomiting [Dysuria] : no dysuria [Incontinence] : no incontinence [Arthralgias] : no arthralgias [Joint Pain] : no joint pain [Skin Lesions] : no skin lesions [Skin Wound] : no skin wound [Confused] : no confusion [Convulsions] : no convulsions [Suicidal] : not suicidal [Sleep Disturbances] : no sleep disturbances [Easy Bleeding] : no tendency for easy bleeding [Easy Bruising] : no tendency for easy bruising [de-identified] : OS

## 2020-10-09 NOTE — PHYSICAL EXAM
[Neck Appearance] : the appearance of the neck was normal [] : no respiratory distress [Respiration, Rhythm And Depth] : normal respiratory rhythm and effort [Apical Impulse] : the apical impulse was normal [Heart Rate And Rhythm] : heart rate was normal and rhythm regular [Arterial Pulses Carotid] : carotid pulses were normal with no bruits [Edema] : there was no peripheral edema [Bowel Sounds] : normal bowel sounds [No CVA Tenderness] : no ~M costovertebral angle tenderness [No Spinal Tenderness] : no spinal tenderness [Skin Color & Pigmentation] : normal skin color and pigmentation [Skin Turgor] : normal skin turgor [Cranial Nerves] : cranial nerves 2-12 were intact [Oriented To Time, Place, And Person] : oriented to person, place, and time [Impaired Insight] : insight and judgment were intact [FreeTextEntry1] : decreased periph sensation

## 2020-10-09 NOTE — HISTORY OF PRESENT ILLNESS
[de-identified] : 63 year old male presents today for new diagnosis of head and neck cancer, referred by Dr. Kaur, ENT.  Mr. Camacho was sent to ENT for a left neck mass with associated left tongue base.  He reported a 3 month history of constant, moderate progressive left sided throat pain with radiating otalgia.  Incidentally it was noted on neck US with vascular surgeon to have a left neck mass.  He has a long standing history of cigarette smoking 5- 6 cigarettes per day and uses alcohol in past however has quit x 5 years.  CT scan done 5/16/19 showed a 5.5cm multiloculated solid and cystic mass within the left latera; neck soft tissues.  Findings may reflect primary neoplasm versus conglomerate of left level 3 cystic/necrotic lymphadenopathy.  Additional abnormal left level 2-4 lymphadenopathy.  Nonspecific prominence of the left lingual tonsils with enhancing soft tissue involving the left oropharyngeal lateral wall.  Regional effacement of fat planes laterally involving the left submandibular gland and posteriorly involving the carotid sheath.  Mild asymmetric enlargement of the left submandibular gland.  PET CT is recommend.  He has not had a biopsy.  He has complaints of a sore throat, with left ear pain at times and new onset of headaches.  \par \par Patient was adopted so he does not know his family history [de-identified] : Patient presents today for follow up of head and neck cancer.  He reports feeling increasingly weak and more tired completing his normal activities.  He also reports developing intermittent chest pain that comes and goes; it does not coincide with activities or rest.  He saw Dr. Olson 9/17/2020 who reprotes 1st degreee AV block with ST abnormality- pending repeat ECHO and stress test. \par His left neck is feeling better; denies pain and tenderness to the site.  He denies feeling dizzy, lightheaded, SOB, palpitations, nausea, vomiting, constipation, and diarrhea.  \par \par

## 2020-10-09 NOTE — PHYSICAL EXAM
[Fully active, able to carry on all pre-disease performance without restriction] : Status 0 - Fully active, able to carry on all pre-disease performance without restriction [Normal] : affect appropriate [de-identified] : left posterior palate - induration together with left tonsillar infiltration, Left cervical adenopathy; improving [de-identified] : BLE edema R>L, improving [de-identified] : left neck base mass significantly decreased in size with red with less erythema, less cystic in appearance, less firm, no pain to palpation, no drainage noted

## 2020-10-09 NOTE — ASSESSMENT
[FreeTextEntry1] : Hyperkalemiafrom combination of RTA 4, CKD and  hyperglycemia,but primarily from severe constipation and decreased intestinal motility ( no BM x > 5 days is normal for him) - strongly advised he start a laxative, OTC ok ( ir MOM, exlax, lactulose), po fluid intake appears ok  - avoid Bactrim, ACEi, NSIADs\par \par check BMEt next week @ chemo, start lax in interim, f/u in ~1-2 months or sooner dep on labs

## 2020-10-09 NOTE — HISTORY OF PRESENT ILLNESS
[FreeTextEntry1] : 63 year old male referred for evaluation of hyperkalemia and BASIM; \par \par PMH\par DM cx by perih neuropathy, PVD, BKA R great toe\par CABG\par CEA\par head and neck ca - initially rec'd XRT, did not tolerate, as of this past August has been on chemo carb/taxol\par Revascularization R leg\par Vein harvesting L leg\par HTN - off all BP meds\par \par Smoking - still smokes ~4 cigs a day\par EtOH - quit\par Is followed by Dr. Velasquez for head and neck cancer -\par \par Referred for hyperkalemia ad CKD\par - etiology of CKD multifactorial, given underlying DM, smoking, HTN and hyperlipidema has underlying CKD.  Was on Bactrim late August to Early September which may explain rise in both K and Cr. Improved after stopping, but K has been elevated ( on 10/8 cr 1.2, initial K 6, post treatment 4.2, of note blood sugars have been elevated.\par - also severely constipated, though he has had a few BMs this week - states typically may not have a BM for over 5 days\par - no longer on antihtn meds which can cause constipation\par

## 2020-10-12 ENCOUNTER — RX RENEWAL (OUTPATIENT)
Age: 65
End: 2020-10-12

## 2020-10-13 ENCOUNTER — APPOINTMENT (OUTPATIENT)
Dept: NEPHROLOGY | Facility: CLINIC | Age: 65
End: 2020-10-13
Payer: MEDICARE

## 2020-10-13 ENCOUNTER — RESULT REVIEW (OUTPATIENT)
Age: 65
End: 2020-10-13

## 2020-10-13 DIAGNOSIS — E87.5 HYPERKALEMIA: ICD-10-CM

## 2020-10-13 PROCEDURE — 36415 COLL VENOUS BLD VENIPUNCTURE: CPT

## 2020-10-13 RX ORDER — SODIUM POLYSTYRENE SULFONATE 15 G/60ML
15 SUSPENSION ORAL; RECTAL
Qty: 1906 | Refills: 6 | Status: ACTIVE | COMMUNITY
Start: 2020-10-13 | End: 1900-01-01

## 2020-10-14 ENCOUNTER — APPOINTMENT (OUTPATIENT)
Dept: PAIN MANAGEMENT | Facility: CLINIC | Age: 65
End: 2020-10-14
Payer: MEDICARE

## 2020-10-14 VITALS — DIASTOLIC BLOOD PRESSURE: 70 MMHG | SYSTOLIC BLOOD PRESSURE: 130 MMHG | TEMPERATURE: 96.2 F | HEIGHT: 70 IN

## 2020-10-14 PROCEDURE — 99214 OFFICE O/P EST MOD 30 MIN: CPT

## 2020-10-14 NOTE — PHYSICAL EXAM
[de-identified] : Constitutional: Well-developed, in no acute distress\par Eyes: Pupil- Right: normal, Left: normal\par Neck exam: Inspection normal\par Respiratory: Normal effort, speaking in full sentences\par Cardiovascular: Regular rate and rhythm\par Vascular: Dorsal pedis pulses normal and equal bilaterally\par Abdomen: Inspection normal, no distension\par Skin: Inspection normal, no bruising noted\par Musculoskeletal:\par Cervical Spine:   \par Gait: Antalgic\par Inspection: Normal curvature, no abnormal kyphosis or scoliosis\par \par Facet loading: pain bilaterally\par \par Palpation:\par Cervical paraspinal muscles: pain bilaterally\par 		\par Muscle Strength:\par Deltoid: 5/5 bilaterally\par Biceps: 5/5 bilaterally\par Triceps: 5/5 bilaterally\par Adductor pollicis: 5/5 bilaterally\par \par Sensation: normal and equal in bilateral upper extremities\par \par Reflexes:\par Biceps (C5): 2+ bilaterally\par Brachioradialis (C6): 2+ bilaterally\par Triceps (C7): 2+ bilaterally\par \par Extremity: no edema noted\par Neurological: Memory normal, AAO x 3, Cranial nerves II - XII grossly normal\par Psychiatric: Appropriate mood and affect, oriented to time, place, person, and situation\par \par \par

## 2020-10-14 NOTE — ASSESSMENT
[FreeTextEntry1] : 64 yom w/ head and neck cancer managed by Dr. Velasquez presents w/ left sided neck and jaw pain. He has a history of alcoholism and is concerned with trying new medications. Other concerns are that his potassium and glucose are not well-controlled. As he is doing very well with gabapentin to 1200 mg TID, continue for now. RTC one month.

## 2020-10-14 NOTE — HISTORY OF PRESENT ILLNESS
[Paroxysmal] : paroxysmal [___ mths] : [unfilled] month(s) ago [7] : a maximum pain level of 7/10 [Sharp] : sharp [Medications] : medications [FreeTextEntry1] : As per my  note dated 06/25/20: "63 yom w/ head and neck cancer managed by Dr. Velasquez presents w/ left sided neck and jaw pain. He has a history of alcoholism and is concerned with trying new medications. He does have relief with gabapentin which she also takes for diabetic neuropathy. Pain is worse with activity. Pain has been increasing."\par \par As per my note dated, 08/06/20. "Since his last visit he was admitted to [place] for hypotension, unknown cause. Reports that he takes sometimes 4-5 gabapentin at a time, no side effects. He has good days and bad days with his jaw. Pain is reasonably well-controlled on the good days."\par \par As per my note dated, 09/09/20. "He reports that the gabapentin is helping him tremendously. Does not wish to change medications."\par \par Pt returns for follow-up today, 10/14/20.  He reports that he is feeling well with his gabapentin. He has had issues with his glucose and potassium. Does not wish to change medications.  [FreeTextEntry2] : 21 [FreeTextEntry7] : left side of ear into throat [FreeTextEntry3] : n/a

## 2020-10-15 ENCOUNTER — RESULT REVIEW (OUTPATIENT)
Age: 65
End: 2020-10-15

## 2020-10-15 ENCOUNTER — APPOINTMENT (OUTPATIENT)
Dept: HEMATOLOGY ONCOLOGY | Facility: CLINIC | Age: 65
End: 2020-10-15
Payer: MEDICARE

## 2020-10-15 VITALS
SYSTOLIC BLOOD PRESSURE: 157 MMHG | TEMPERATURE: 97 F | HEIGHT: 70 IN | RESPIRATION RATE: 18 BRPM | HEART RATE: 59 BPM | WEIGHT: 226 LBS | OXYGEN SATURATION: 100 % | BODY MASS INDEX: 32.35 KG/M2 | DIASTOLIC BLOOD PRESSURE: 63 MMHG

## 2020-10-15 PROCEDURE — 99215 OFFICE O/P EST HI 40 MIN: CPT

## 2020-10-15 NOTE — ASSESSMENT
[FreeTextEntry1] : 63 yo male referred by Dr. 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 Patient has palpable left cervical adenopathy. \par \par Patient offered chemo/ RT - agreed for RT only, received 3000 cGy of 7000 cGY, stopped the treatment b/o poor tolerance in October 2019. \par \par  Presented in April 2020 with POD -Left side of the neck - induration at the base of the neck \par Tumor p16 positive\par  Foudation One- PDL-1 1- ( positive), MS - stable, TMB- 4,  PIK3CA amplification ( alpelisib), CCND1 amplification ( abemaciclib) \par  Patient evaluated by Dr. YING Ureña - not a candidate for surgery\par - Refered to Dr. Thapa for evaluation for palliative RT - declined \par - restaging PETCT 3/2020 reviewed- locally advanced disease, with increased pain\par - Pembrolizumab - cycle 2 4/23/2020, cycle 3- 5/14/2020, cycle 4 6/4/2020, cycle 6 /16/2020, 7/16/2020\par - BP decreased - patient on Losartan/ Metoprolol, off meds - with hyperkalemia, hyponatremia \par \par 8/6/2020- progression of the disease, hyperglycemia\par - 8/13/2020 - week 1  carbo/ taxol weekly.  SIde effects explained to patient \par - pain controlled, weight stable, increased left base of the neck adenopathy, with impending ulceration, offered RT again, declined.  Explained the POD with risk of ulceration from rapidly growing cancer. \par - when diabetes under control will rx PIQRAY\par 8/27/2020 blood pressure (100/54) and hgb (8.5) downtrending possibly due to slow bleed, WBC 2.2 with manual ANC 1.1; patient refusing treatment as fearful of counts dropping further.  patient refusing blood transfusion at this time, but agreeable to receiving IV fluids.  BUN and creatinine increasing with hyperkalemia (5.7)\par patient told recurrent mass +/- drainage likely due to progression of disease, he continues to refuse radiation treatments\par 8/31/2020 treatment held, patient sent to ED for hyperkalemia 6.0 (last 5.7 8/27/2020), hgb 8.9, WBC 3.2, CRP increasing now 17.4\par 9/10/2020 taxo/carbo weekly; referral to see Dr. Pina for ongoing hyperkalemia and worsening renal function\par 9/17/2020 taxo/carbo #4 continues, left neck mass responding to treatment, patient needs to see Dr. Pina renal function remains compromised and hyperkalemia (5.8) continues\par 9/24/2020 treatment held due to cardiac symptoms, troponin checked (27) has been elevated previously, Dr Olson notified of symptoms and patient told to call his office should the angina worsen.  Weakness may be cardiac related, blood pressure also slightly lower than normal; need to follow up with Dr. Pina\par 10/9 /2020  Feels better, decreased mass at the base of the neck.  Stable PO Intake.  Undergoing cardiology evaluation.  Hyperkalemia- repeat testing today. carbo/ taxol weekly day 1 today. \par 10/15/2020- hyperkalemia - seen in ER, tx with kayaxylate, repeat today. ANemia with Hg 8.4, \par Carbo/ taxol cycle 2 day 8 of chemo, ok to treat.  Clinical response, last scan in August 2020, will plan for scans in November.

## 2020-10-15 NOTE — CONSULT LETTER
[Dear  ___] : Dear  [unfilled], [Consult Letter:] : I had the pleasure of evaluating your patient, [unfilled]. [Please see my note below.] : Please see my note below. [Consult Closing:] : Thank you very much for allowing me to participate in the care of this patient.  If you have any questions, please do not hesitate to contact me. [Sincerely,] : Sincerely, [DrEvie  ___] : Dr. CHOWDARY [DrEvie ___] : Dr. CHOWDARY [FreeTextEntry3] : Ana Velasquez MD\par [facility] [place] at [facility]\par

## 2020-10-15 NOTE — REVIEW OF SYSTEMS
[Fatigue] : fatigue [Lower Ext Edema] : lower extremity edema [Skin Wound] : skin wound [Negative] : Heme/Lymph [Recent Change In Weight] : ~T no recent weight change [Diarrhea] : no diarrhea [Dysphagia] : no dysphagia [FreeTextEntry2] : worsening [de-identified] : left neck no drainage, getting smaller [FreeTextEntry5] : improving

## 2020-10-15 NOTE — HISTORY OF PRESENT ILLNESS
[de-identified] : 63 year old male presents today for new diagnosis of head and neck cancer, referred by Dr. Kaur, ENT.  Mr. Camacho was sent to ENT for a left neck mass with associated left tongue base.  He reported a 3 month history of constant, moderate progressive left sided throat pain with radiating otalgia.  Incidentally it was noted on neck US with vascular surgeon to have a left neck mass.  He has a long standing history of cigarette smoking 5- 6 cigarettes per day and uses alcohol in past however has quit x 5 years.  CT scan done 5/16/19 showed a 5.5cm multiloculated solid and cystic mass within the left latera; neck soft tissues.  Findings may reflect primary neoplasm versus conglomerate of left level 3 cystic/necrotic lymphadenopathy.  Additional abnormal left level 2-4 lymphadenopathy.  Nonspecific prominence of the left lingual tonsils with enhancing soft tissue involving the left oropharyngeal lateral wall.  Regional effacement of fat planes laterally involving the left submandibular gland and posteriorly involving the carotid sheath.  Mild asymmetric enlargement of the left submandibular gland.  PET CT is recommend.  He has not had a biopsy.  He has complaints of a sore throat, with left ear pain at times and new onset of headaches.  \par \par Patient was adopted so he does not know his family history [de-identified] : Patient presents today for follow up of head and neck cancer.  He also reports developing intermittent chest pain that comes and goes; it does not coincide with activities or rest.  He saw Dr. Olson 9/17/2020 who reports 1st degree AV block with ST abnormality- pending repeat ECHO and stress test.\par \par Denies pain of left neck. He denies feeling dizzy, lightheaded, SOB, palpitations, nausea, vomiting, constipation, and diarrhea.  \par Patient was in ER this Tuesday for K of 6.8.\par

## 2020-10-15 NOTE — PHYSICAL EXAM
[Fully active, able to carry on all pre-disease performance without restriction] : Status 0 - Fully active, able to carry on all pre-disease performance without restriction [Normal] : affect appropriate [de-identified] : left posterior palate - induration together with left tonsillar infiltration, Left cervical adenopathy; improving [de-identified] : BLE edema R>L, improving [de-identified] : left neck base mass significantly decreased in size with red with less erythema, less cystic in appearance, less firm, no pain to palpation, no drainage noted

## 2020-10-16 ENCOUNTER — RESULT REVIEW (OUTPATIENT)
Age: 65
End: 2020-10-16

## 2020-10-19 ENCOUNTER — RESULT REVIEW (OUTPATIENT)
Age: 65
End: 2020-10-19

## 2020-10-20 ENCOUNTER — APPOINTMENT (OUTPATIENT)
Dept: GASTROENTEROLOGY | Facility: HOSPITAL | Age: 65
End: 2020-10-20

## 2020-10-20 ENCOUNTER — RESULT REVIEW (OUTPATIENT)
Age: 65
End: 2020-10-20

## 2020-11-05 PROBLEM — K21.9 CHRONIC GERD: Status: ACTIVE | Noted: 2020-01-01

## 2020-11-05 NOTE — REVIEW OF SYSTEMS
[Fatigue] : fatigue [Lower Ext Edema] : lower extremity edema [Skin Wound] : skin wound [Negative] : Heme/Lymph [Recent Change In Weight] : ~T no recent weight change [Dysphagia] : no dysphagia [Diarrhea] : no diarrhea [FreeTextEntry2] : worsening [FreeTextEntry5] : improving [de-identified] : left neck no drainage, getting smaller

## 2020-11-05 NOTE — CONSULT LETTER
[Dear  ___] : Dear  [unfilled], [Consult Letter:] : I had the pleasure of evaluating your patient, [unfilled]. [Please see my note below.] : Please see my note below. [Consult Closing:] : Thank you very much for allowing me to participate in the care of this patient.  If you have any questions, please do not hesitate to contact me. [Sincerely,] : Sincerely, [DrEvie  ___] : Dr. CHOWDARY [DrEvie ___] : Dr. CHOWDARY [FreeTextEntry3] : Ana Velasquez MD\par [facility] [place] at [facility]\par

## 2020-11-05 NOTE — HISTORY OF PRESENT ILLNESS
[de-identified] : 63 year old male presents today for new diagnosis of head and neck cancer, referred by Dr. Kaur, ENT.  Mr. Camacho was sent to ENT for a left neck mass with associated left tongue base.  He reported a 3 month history of constant, moderate progressive left sided throat pain with radiating otalgia.  Incidentally it was noted on neck US with vascular surgeon to have a left neck mass.  He has a long standing history of cigarette smoking 5- 6 cigarettes per day and uses alcohol in past however has quit x 5 years.  CT scan done 5/16/19 showed a 5.5cm multiloculated solid and cystic mass within the left latera; neck soft tissues.  Findings may reflect primary neoplasm versus conglomerate of left level 3 cystic/necrotic lymphadenopathy.  Additional abnormal left level 2-4 lymphadenopathy.  Nonspecific prominence of the left lingual tonsils with enhancing soft tissue involving the left oropharyngeal lateral wall.  Regional effacement of fat planes laterally involving the left submandibular gland and posteriorly involving the carotid sheath.  Mild asymmetric enlargement of the left submandibular gland.  PET CT is recommend.  He has not had a biopsy.  He has complaints of a sore throat, with left ear pain at times and new onset of headaches.  \par \par Patient was adopted so he does not know his family history [de-identified] : Patient presents today for follow up of head and neck cancer. Reports chest discomfort and has poor balance.  He saw Dr. Olson 9/17/2020 who reports 1st degree AV block with ST abnormality- pending repeat ECHO and stress test.\par \par Denies pain of left neck. He denies feeling dizzy, lightheaded, SOB, palpitations, nausea, vomiting, constipation, and diarrhea. Patient recently hospitalized for problem with gallbladder.\par

## 2020-11-05 NOTE — PHYSICAL EXAM
[Fully active, able to carry on all pre-disease performance without restriction] : Status 0 - Fully active, able to carry on all pre-disease performance without restriction [Normal] : affect appropriate [de-identified] : left posterior palate - induration together with left tonsillar infiltration, Left cervical adenopathy; improving [de-identified] : BLE edema R>L, improving [de-identified] : left neck base mass significantly decreased in size with red with less erythema, less cystic in appearance, less firm, no pain to palpation, no drainage noted

## 2020-11-05 NOTE — ASSESSMENT
[FreeTextEntry1] : 65 yo male referred by Dr. 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 Patient has palpable left cervical adenopathy. \par \par Patient offered chemo/ RT - agreed for RT only, received 3000 cGy of 7000 cGY, stopped the treatment b/o poor tolerance in October 2019. \par \par  Presented in April 2020 with POD -Left side of the neck - induration at the base of the neck \par Tumor p16 positive\par  Foudation One- PDL-1 1- ( positive), MS - stable, TMB- 4,  PIK3CA amplification ( alpelisib), CCND1 amplification ( abemaciclib) \par  Patient evaluated by Dr. YING Ureña - not a candidate for surgery\par - Refered to Dr. Thapa for evaluation for palliative RT - declined \par - restaging PETCT 3/2020 reviewed- locally advanced disease, with increased pain\par - Pembrolizumab - cycle 2 4/23/2020, cycle 3- 5/14/2020, cycle 4 6/4/2020, cycle 6 /16/2020, 7/16/2020\par - BP decreased - patient on Losartan/ Metoprolol, off meds - with hyperkalemia, hyponatremia \par \par 8/6/2020- progression of the disease, hyperglycemia\par - 8/13/2020 - week 1  carbo/ taxol weekly.  SIde effects explained to patient \par - pain controlled, weight stable, increased left base of the neck adenopathy, with impending ulceration, offered RT again, declined.  Explained the POD with risk of ulceration from rapidly growing cancer. \par - when diabetes under control will rx PIQRAY\par 8/27/2020 blood pressure (100/54) and hgb (8.5) downtrending possibly due to slow bleed, WBC 2.2 with manual ANC 1.1; patient refusing treatment as fearful of counts dropping further.  patient refusing blood transfusion at this time, but agreeable to receiving IV fluids.  BUN and creatinine increasing with hyperkalemia (5.7)\par patient told recurrent mass +/- drainage likely due to progression of disease, he continues to refuse radiation treatments\par 8/31/2020 treatment held, patient sent to ED for hyperkalemia 6.0 (last 5.7 8/27/2020), hgb 8.9, WBC 3.2, CRP increasing now 17.4\par 9/10/2020 taxo/carbo weekly; referral to see Dr. Pina for ongoing hyperkalemia and worsening renal function\par 9/17/2020 taxo/carbo #4 continues, left neck mass responding to treatment, patient needs to see Dr. Pina renal function remains compromised and hyperkalemia (5.8) continues\par 9/24/2020 treatment held due to cardiac symptoms, troponin checked (27) has been elevated previously, Dr Olson notified of symptoms and patient told to call his office should the angina worsen.  Weakness may be cardiac related, blood pressure also slightly lower than normal; need to follow up with Dr. Pina\par 10/9 /2020  Feels better, decreased mass at the base of the neck.  Stable PO Intake.  Undergoing cardiology evaluation.  Hyperkalemia- repeat testing today. carbo/ taxol weekly day 1 today. \par 10/15/2020- Taxol/ carbo cycle 3 - total dose Taxol x 6\par 11/5/2020 - cycle 4 - carbo/ taxol day 1, 8.  Developing neuropathy- will lower the dose and complete after cycle 4. Restaging PETCT  \par Neuropathy - on gabapentin \par GERD and reflux - start PPI

## 2020-11-12 NOTE — CONSULT LETTER
[Dear  ___] : Dear  [unfilled], [Consult Letter:] : I had the pleasure of evaluating your patient, [unfilled]. [Please see my note below.] : Please see my note below. [Consult Closing:] : Thank you very much for allowing me to participate in the care of this patient.  If you have any questions, please do not hesitate to contact me. [Sincerely,] : Sincerely, [DrEvie  ___] : Dr. CHOWDARY [DrEvie ___] : Dr. CHOWDARY [FreeTextEntry3] : Ana Velasquez MD\par [facility] [place] at [facility]\par

## 2020-11-12 NOTE — PHYSICAL EXAM
[Fully active, able to carry on all pre-disease performance without restriction] : Status 0 - Fully active, able to carry on all pre-disease performance without restriction [Normal] : affect appropriate [de-identified] : left posterior palate - induration together with left tonsillar infiltration, Left cervical adenopathy; improving [de-identified] : BLE edema R>L, improving [de-identified] : left neck base mass significantly decreased in size with red with less erythema, less cystic in appearance, less firm, no pain to palpation, no drainage noted

## 2020-11-12 NOTE — REVIEW OF SYSTEMS
[Fatigue] : fatigue [Lower Ext Edema] : lower extremity edema [Skin Wound] : skin wound [Negative] : Heme/Lymph [Recent Change In Weight] : ~T no recent weight change [Dysphagia] : no dysphagia [Diarrhea] : no diarrhea [FreeTextEntry2] : worsening [FreeTextEntry5] : improving [de-identified] : left neck no drainage, getting smaller

## 2020-11-12 NOTE — ASSESSMENT
[FreeTextEntry1] : 65 yo male referred by Dr. 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 Patient has palpable left cervical adenopathy. \par \par Patient offered chemo/ RT - agreed for RT only, received 3000 cGy of 7000 cGY, stopped the treatment b/o poor tolerance in October 2019. \par \par  Presented in April 2020 with POD -Left side of the neck - induration at the base of the neck \par Tumor p16 positive\par  Foudation One- PDL-1 1- ( positive), MS - stable, TMB- 4,  PIK3CA amplification ( alpelisib), CCND1 amplification ( abemaciclib) \par  Patient evaluated by Dr. YING Ureña - not a candidate for surgery\par - Refered to Dr. Thapa for evaluation for palliative RT - declined \par - restaging PETCT 3/2020 reviewed- locally advanced disease, with increased pain\par - Pembrolizumab - cycle 2 4/23/2020, cycle 3- 5/14/2020, cycle 4 6/4/2020, cycle 6 /16/2020, 7/16/2020\par - BP decreased - patient on Losartan/ Metoprolol, off meds - with hyperkalemia, hyponatremia \par \par 8/6/2020- progression of the disease, hyperglycemia\par - 8/13/2020 - week 1  carbo/ taxol weekly.  SIde effects explained to patient \par - pain controlled, weight stable, increased left base of the neck adenopathy, with impending ulceration, offered RT again, declined.  Explained the POD with risk of ulceration from rapidly growing cancer. \par - when diabetes under control will rx PIQRAY\par 8/27/2020 blood pressure (100/54) and hgb (8.5) downtrending possibly due to slow bleed, WBC 2.2 with manual ANC 1.1; patient refusing treatment as fearful of counts dropping further.  patient refusing blood transfusion at this time, but agreeable to receiving IV fluids.  BUN and creatinine increasing with hyperkalemia (5.7)\par patient told recurrent mass +/- drainage likely due to progression of disease, he continues to refuse radiation treatments\par 8/31/2020 treatment held, patient sent to ED for hyperkalemia 6.0 (last 5.7 8/27/2020), hgb 8.9, WBC 3.2, CRP increasing now 17.4\par 9/10/2020 taxo/carbo weekly; referral to see Dr. Pina for ongoing hyperkalemia and worsening renal function\par 9/17/2020 taxo/carbo #4 continues, left neck mass responding to treatment, patient needs to see Dr. Pina renal function remains compromised and hyperkalemia (5.8) continues\par 9/24/2020 treatment held due to cardiac symptoms, troponin checked (27) has been elevated previously, Dr Olson notified of symptoms and patient told to call his office should the angina worsen.  Weakness may be cardiac related, blood pressure also slightly lower than normal; need to follow up with Dr. Pina\par 10/9 /2020  Feels better, decreased mass at the base of the neck.  Stable PO Intake.  Undergoing cardiology evaluation.  Hyperkalemia- repeat testing today. carbo/ taxol weekly day 1 today. \par 10/15/2020- Taxol/ carbo cycle 3 - total dose Taxol x 6\par 11/5/2020 - cycle 4 - carbo/ taxol day 1, 8.  Developing neuropathy- will lower the dose and complete after cycle 4 restaging PETCT  \par 11/12/2020- c/o cough, increased at night, RX PPI last week, he is unclear if picked up.  \par INcreased neuropathy in hands, feet, - hold chemo today\par Hg 8.9 - monitor \par \par Neuropathy - on gabapentin \par GERD and reflux - start PPI

## 2020-11-12 NOTE — HISTORY OF PRESENT ILLNESS
[de-identified] : 63 year old male presents today for new diagnosis of head and neck cancer, referred by Dr. Kaur, ENT.  Mr. Camacho was sent to ENT for a left neck mass with associated left tongue base.  He reported a 3 month history of constant, moderate progressive left sided throat pain with radiating otalgia.  Incidentally it was noted on neck US with vascular surgeon to have a left neck mass.  He has a long standing history of cigarette smoking 5- 6 cigarettes per day and uses alcohol in past however has quit x 5 years.  CT scan done 5/16/19 showed a 5.5cm multiloculated solid and cystic mass within the left latera; neck soft tissues.  Findings may reflect primary neoplasm versus conglomerate of left level 3 cystic/necrotic lymphadenopathy.  Additional abnormal left level 2-4 lymphadenopathy.  Nonspecific prominence of the left lingual tonsils with enhancing soft tissue involving the left oropharyngeal lateral wall.  Regional effacement of fat planes laterally involving the left submandibular gland and posteriorly involving the carotid sheath.  Mild asymmetric enlargement of the left submandibular gland.  PET CT is recommend.  He has not had a biopsy.  He has complaints of a sore throat, with left ear pain at times and new onset of headaches.  \par \par Patient was adopted so he does not know his family history [de-identified] : Patient presents today for follow up of head and neck cancer. Reports chest discomfort and has poor balance.  He saw Dr. Olson 9/17/2020 who reports 1st degree AV block with ST abnormality- pending repeat ECHO and stress test.\par \par He denies feeling dizzy, lightheaded, SOB, palpitations, nausea, vomiting, constipation, and diarrhea. Generalized pain throughout body, especially in the back. Patient scheduled for CT scan this Saturday.\par

## 2020-11-20 PROBLEM — K81.9 CHOLECYSTITIS: Status: ACTIVE | Noted: 2020-01-01

## 2020-11-20 NOTE — CONSULT LETTER
[Dear  ___] : Dear  [unfilled], [Consult Letter:] : I had the pleasure of evaluating your patient, [unfilled]. [Please see my note below.] : Please see my note below. [Consult Closing:] : Thank you very much for allowing me to participate in the care of this patient.  If you have any questions, please do not hesitate to contact me. [Sincerely,] : Sincerely, [DrEvie  ___] : Dr. CHOWDARY [DrEvie ___] : Dr. CHOWDARY [FreeTextEntry3] : Ana Velasquez MD\par [facility] [place] at [facility]\par

## 2020-11-20 NOTE — HISTORY OF PRESENT ILLNESS
[de-identified] : 63 year old male presents today for new diagnosis of head and neck cancer, referred by Dr. Kaur, ENT.  Mr. Camacho was sent to ENT for a left neck mass with associated left tongue base.  He reported a 3 month history of constant, moderate progressive left sided throat pain with radiating otalgia.  Incidentally it was noted on neck US with vascular surgeon to have a left neck mass.  He has a long standing history of cigarette smoking 5- 6 cigarettes per day and uses alcohol in past however has quit x 5 years.  CT scan done 5/16/19 showed a 5.5cm multiloculated solid and cystic mass within the left latera; neck soft tissues.  Findings may reflect primary neoplasm versus conglomerate of left level 3 cystic/necrotic lymphadenopathy.  Additional abnormal left level 2-4 lymphadenopathy.  Nonspecific prominence of the left lingual tonsils with enhancing soft tissue involving the left oropharyngeal lateral wall.  Regional effacement of fat planes laterally involving the left submandibular gland and posteriorly involving the carotid sheath.  Mild asymmetric enlargement of the left submandibular gland.  PET CT is recommend.  He has not had a biopsy.  He has complaints of a sore throat, with left ear pain at times and new onset of headaches.  \par \par Patient was adopted so he does not know his family history [de-identified] : Patient presents today for follow up of head and neck cancer. Reports chest discomfort and has poor balance.  He saw Dr. Olson 9/17/2020 who reports 1st degree AV block with ST abnormality- pending repeat ECHO and stress test.\par \par He denies feeling dizzy, lightheaded, SOB, palpitations, nausea, vomiting, constipation, and diarrhea. Generalized pain throughout body, especially in the back. Patient scheduled for CT scan this Saturday.\par

## 2020-11-20 NOTE — REVIEW OF SYSTEMS
[Fatigue] : fatigue [Lower Ext Edema] : lower extremity edema [Skin Wound] : skin wound [Negative] : Heme/Lymph [Recent Change In Weight] : ~T no recent weight change [Dysphagia] : no dysphagia [Diarrhea] : no diarrhea [FreeTextEntry2] : worsening [FreeTextEntry5] : improving [de-identified] : left neck no drainage, getting smaller

## 2020-11-20 NOTE — ASSESSMENT
[FreeTextEntry1] : 66 yo male referred by Dr. 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 Patient has palpable left cervical adenopathy. \par \par Patient offered chemo/ RT - agreed for RT only, received 3000 cGy of 7000 cGY, stopped the treatment b/o poor tolerance in October 2019. \par \par  Presented in April 2020 with POD -Left side of the neck - induration at the base of the neck \par Tumor p16 positive\par  Foudation One- PDL-1 1- ( positive), MS - stable, TMB- 4,  PIK3CA amplification ( alpelisib), CCND1 amplification ( abemaciclib) \par  Patient evaluated by Dr. YING Ureña - not a candidate for surgery\par - Refered to Dr. Thapa for evaluation for palliative RT - declined \par - restaging PETCT 3/2020 reviewed- locally advanced disease, with increased pain\par - Pembrolizumab - cycle 2 4/23/2020, cycle 3- 5/14/2020, cycle 4 6/4/2020, cycle 6 /16/2020, 7/16/2020\par - BP decreased - patient on Losartan/ Metoprolol, off meds - with hyperkalemia, hyponatremia \par \par 8/6/2020- progression of the disease, hyperglycemia\par - 8/13/2020 - week 1  carbo/ taxol weekly.  SIde effects explained to patient \par - pain controlled, weight stable, increased left base of the neck adenopathy, with impending ulceration, offered RT again, declined.  Explained the POD with risk of ulceration from rapidly growing cancer. \par - when diabetes under control will rx PIQRAY\par 8/27/2020 blood pressure (100/54) and hgb (8.5) downtrending possibly due to slow bleed, WBC 2.2 with manual ANC 1.1; patient refusing treatment as fearful of counts dropping further.  patient refusing blood transfusion at this time, but agreeable to receiving IV fluids.  BUN and creatinine increasing with hyperkalemia (5.7)\par patient told recurrent mass +/- drainage likely due to progression of disease, he continues to refuse radiation treatments\par 8/31/2020 treatment held, patient sent to ED for hyperkalemia 6.0 (last 5.7 8/27/2020), hgb 8.9, WBC 3.2, CRP increasing now 17.4\par 9/10/2020 taxo/carbo weekly; referral to see Dr. Pina for ongoing hyperkalemia and worsening renal function\par 9/17/2020 taxo/carbo #4 continues, left neck mass responding to treatment, patient needs to see Dr. Pina renal function remains compromised and hyperkalemia (5.8) continues\par 9/24/2020 treatment held due to cardiac symptoms, troponin checked (27) has been elevated previously, Dr Olson notified of symptoms and patient told to call his office should the angina worsen.  Weakness may be cardiac related, blood pressure also slightly lower than normal; need to follow up with Dr. Pina\par 10/9 /2020  Feels better, decreased mass at the base of the neck.  Stable PO Intake.  Undergoing cardiology evaluation.  Hyperkalemia- repeat testing today. carbo/ taxol weekly day 1 today. \par 10/15/2020- Taxol/ carbo cycle 3 - total dose Taxol x 6\par 11/5/2020 - cycle 4 - carbo/ taxol day 1, 8.  Developing neuropathy- will lower the dose and complete after cycle 4 restaging PETCT  \par 11/12/2020- c/o cough, increased at night, RX PPI last week, he is unclear if picked up.  \par INcreased neuropathy in hands, feet, - hold chemo today\par Hg 8.9 - monitor \par \par 11/19/2020\par PETCT with resolution of malignancy/ adenopathy- hold chemo for now\par - GI / Surgery/ cardio evaluation for cholecystectomy. \par  \par Neuropathy - on gabapentin \par GERD and reflux - start PPI

## 2020-11-20 NOTE — PHYSICAL EXAM
[Fully active, able to carry on all pre-disease performance without restriction] : Status 0 - Fully active, able to carry on all pre-disease performance without restriction [Normal] : affect appropriate [de-identified] : left posterior palate - induration together with left tonsillar infiltration, Left cervical adenopathy; improving [de-identified] : BLE edema R>L, improving [de-identified] : left neck base mass significantly decreased in size with red with less erythema, less cystic in appearance, less firm, no pain to palpation, no drainage noted

## 2020-12-16 PROBLEM — L89.896 PRESSURE INJURY OF DEEP TISSUE OF LEFT FOOT: Status: ACTIVE | Noted: 2020-01-01

## 2020-12-16 PROBLEM — L89.521 PRESSURE INJURY OF LEFT ANKLE, STAGE 1: Status: ACTIVE | Noted: 2020-01-01

## 2020-12-16 NOTE — HISTORY OF PRESENT ILLNESS
[FreeTextEntry1] : The patient presents for follow up of chronic and painful mycotic nail disease as well as painful kyperkeratoses. Past professional  tx's in the past in the presence of PAD have consisted of periodic debridements of both nails and painful keratoses which have offered significant relief in controlling of symptoms and the patient presents for follow up care.\par \par new c/o wound top of right ankle since being hopitalized

## 2020-12-16 NOTE — PHYSICAL EXAM
[General Appearance - Alert] : alert [General Appearance - In No Acute Distress] : in no acute distress [de-identified] : DTI [de-identified] : betadine for DTI left ankle [TWNoteComboBox1] : Left [TWNoteComboBox2] : 1 [TWNoteComboBox3] : PT [TWNoteComboBox4] : None [TWNoteComboBox6] : Pressure [de-identified] : Normal [de-identified] : False [FreeTextEntry1] : The patient has all contributing factors to onychomycosis including but not limited to thickness, subungual debris, discoloration and partial lysis and they are brittle when cut.    Painful hyperkeratotic lesions noted sub 5 right

## 2020-12-16 NOTE — PROCEDURE
[FreeTextEntry1] : Using sterile instrumentation debridement of all nails manually and electrically to decrease thickness, pain and girth and make shoe gear more comfortable with "slant back" procedure of any bordering spicules causing pain\par Shaving and padding of a benign hyperkeratotic lesion\par A complete and thorough evaluation of the type of shoes they should be wearing and type of shoes for this time of year was discussed with patient.\par I have reviewed the care orders with the patient they understand and they also understands the long-term consequences of not following my wound care orders as well  precautions I have recommended  and shoe gear advice I have mentioned.\par A long discussion with the patient regarding the need for pressure relief and offloading and the importance of it regarding healing, and most importantly after healing prevention of recurrence. I explained some of the long term consequences if offloading is not adhered to. This can result in loss of toe, toes, foot, or below the knee amputation. If the patient is not compliant regarding offloading and pressure relief severe consequences and long-term deformities could result. Some options discussed were but not limited to customize orthotics, he can walk her with customized innersole, an adult-type scooter, and total contact casting.\par \par follow up appt 4 weeks\par

## 2020-12-16 NOTE — REVIEW OF SYSTEMS
[Negative] : Heme/Lymph [Leg Claudication] : no intermittent leg claudication [Lower Ext Edema] : no extremity edema [Limb Pain] : no limb pain [Limb Swelling] : no limb swelling [Limb Weakness] : no limb weakness [Difficulty Walking] : no difficulty walking

## 2020-12-23 NOTE — PHYSICAL EXAM
[Fully active, able to carry on all pre-disease performance without restriction] : Status 0 - Fully active, able to carry on all pre-disease performance without restriction [Normal] : affect appropriate [de-identified] : left posterior palate - induration together with left tonsillar infiltration, Left cervical adenopathy; improving [de-identified] : BLE edema R>L, improving [de-identified] : left neck base mass significantly decreased in size with red with less erythema, less cystic in appearance, less firm, no pain to palpation, no drainage noted

## 2020-12-23 NOTE — CONSULT LETTER
[Dear  ___] : Dear  [unfilled], [Consult Letter:] : I had the pleasure of evaluating your patient, [unfilled]. [Please see my note below.] : Please see my note below. [Consult Closing:] : Thank you very much for allowing me to participate in the care of this patient.  If you have any questions, please do not hesitate to contact me. [Sincerely,] : Sincerely, [DrEvie  ___] : Dr. CHOWDARY [DrEvie ___] : Dr. CHOWDARY [FreeTextEntry3] : Ana Velasquez MD\par [facility] [place] at [facility]\par

## 2020-12-23 NOTE — ASSESSMENT
[FreeTextEntry1] : 64 yo male referred by Dr. 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 Patient has palpable left cervical adenopathy. \par \par Patient offered chemo/ RT - agreed for RT only, received 3000 cGy of 7000 cGY, stopped the treatment b/o poor tolerance in October 2019. \par \par  Presented in April 2020 with POD -Left side of the neck - induration at the base of the neck \par Tumor p16 positive\par  Foudation One- PDL-1 1- ( positive), MS - stable, TMB- 4,  PIK3CA amplification ( alpelisib), CCND1 amplification ( abemaciclib) \par  Patient evaluated by Dr. YING Ureña - not a candidate for surgery\par - Refered to Dr. Thapa for evaluation for palliative RT - declined \par - restaging PETCT 3/2020 reviewed- locally advanced disease, with increased pain\par - Pembrolizumab - cycle 2 4/23/2020, cycle 3- 5/14/2020, cycle 4 6/4/2020, cycle 6 /16/2020, 7/16/2020\par - BP decreased - patient on Losartan/ Metoprolol, off meds - with hyperkalemia, hyponatremia \par \par 8/6/2020- progression of the disease, hyperglycemia\par - 8/13/2020 - week 1  carbo/ taxol weekly.  SIde effects explained to patient \par - pain controlled, weight stable, increased left base of the neck adenopathy, with impending ulceration, offered RT again, declined.  Explained the POD with risk of ulceration from rapidly growing cancer. \par - when diabetes under control will rx PIQRAY\par 8/27/2020 blood pressure (100/54) and hgb (8.5) downtrending possibly due to slow bleed, WBC 2.2 with manual ANC 1.1; patient refusing treatment as fearful of counts dropping further.  patient refusing blood transfusion at this time, but agreeable to receiving IV fluids.  BUN and creatinine increasing with hyperkalemia (5.7)\par patient told recurrent mass +/- drainage likely due to progression of disease, he continues to refuse radiation treatments\par 8/31/2020 treatment held, patient sent to ED for hyperkalemia 6.0 (last 5.7 8/27/2020), hgb 8.9, WBC 3.2, CRP increasing now 17.4\par 9/10/2020 taxo/carbo weekly; referral to see Dr. Pina for ongoing hyperkalemia and worsening renal function\par 9/17/2020 taxo/carbo #4 continues, left neck mass responding to treatment, patient needs to see Dr. Pina renal function remains compromised and hyperkalemia (5.8) continues\par 9/24/2020 treatment held due to cardiac symptoms, troponin checked (27) has been elevated previously, Dr Olson notified of symptoms and patient told to call his office should the angina worsen.  Weakness may be cardiac related, blood pressure also slightly lower than normal; need to follow up with Dr. Pina\par 10/9 /2020  Feels better, decreased mass at the base of the neck.  Stable PO Intake.  Undergoing cardiology evaluation.  Hyperkalemia- repeat testing today. carbo/ taxol weekly day 1 today. \par 10/15/2020- Taxol/ carbo cycle 3 - total dose Taxol x 6\par 11/5/2020 - cycle 4 - carbo/ taxol day 1, 8.  Developing neuropathy- will lower the dose and complete after cycle 4 restaging PETCT  \par 11/12/2020- c/o cough, increased at night, RX PPI last week, he is unclear if picked up.  \par INcreased neuropathy in hands, feet, - hold chemo today\par Hg 8.9 - monitor \par \par 11/19/2020\par PETCT with resolution of malignancy/ adenopathy- hold chemo for now\par - GI / Surgery/ cardio evaluation for cholecystectomy. \par  Neuropathy - on gabapentin \par GERD and reflux - start PPI \par \par 12/23/2020\par s/p cholecystectomy\par Hg 10.5 - better\par Still c/o neuropathy - dropping objects, declined OT\par PETCT - Nov 14, 2020- stable \par RTC 6-8 weeks\par Labs- TSH, CEA, \par \par

## 2020-12-23 NOTE — HISTORY OF PRESENT ILLNESS
[de-identified] : 63 year old male presents today for new diagnosis of head and neck cancer, referred by Dr. Kaur, ENT.  Mr. Camacho was sent to ENT for a left neck mass with associated left tongue base.  He reported a 3 month history of constant, moderate progressive left sided throat pain with radiating otalgia.  Incidentally it was noted on neck US with vascular surgeon to have a left neck mass.  He has a long standing history of cigarette smoking 5- 6 cigarettes per day and uses alcohol in past however has quit x 5 years.  CT scan done 5/16/19 showed a 5.5cm multiloculated solid and cystic mass within the left latera; neck soft tissues.  Findings may reflect primary neoplasm versus conglomerate of left level 3 cystic/necrotic lymphadenopathy.  Additional abnormal left level 2-4 lymphadenopathy.  Nonspecific prominence of the left lingual tonsils with enhancing soft tissue involving the left oropharyngeal lateral wall.  Regional effacement of fat planes laterally involving the left submandibular gland and posteriorly involving the carotid sheath.  Mild asymmetric enlargement of the left submandibular gland.  PET CT is recommend.  He has not had a biopsy.  He has complaints of a sore throat, with left ear pain at times and new onset of headaches.  \par \par Patient was adopted so he does not know his family history [de-identified] : Patient presents today for follow up of head and neck cancer. Patient states that he is recovering well from his surgery. S/p cholecystectomy

## 2020-12-23 NOTE — REVIEW OF SYSTEMS
[Fatigue] : fatigue [Lower Ext Edema] : lower extremity edema [Negative] : Neurological [Recent Change In Weight] : ~T no recent weight change [Dysphagia] : no dysphagia [Diarrhea] : no diarrhea [FreeTextEntry2] : worsening [FreeTextEntry5] : improving

## 2021-01-01 ENCOUNTER — NON-APPOINTMENT (OUTPATIENT)
Age: 66
End: 2021-01-01

## 2021-01-01 ENCOUNTER — RESULT REVIEW (OUTPATIENT)
Age: 66
End: 2021-01-01

## 2021-01-01 ENCOUNTER — APPOINTMENT (OUTPATIENT)
Dept: PAIN MANAGEMENT | Facility: CLINIC | Age: 66
End: 2021-01-01
Payer: MEDICARE

## 2021-01-01 ENCOUNTER — APPOINTMENT (OUTPATIENT)
Dept: HEMATOLOGY ONCOLOGY | Facility: CLINIC | Age: 66
End: 2021-01-01
Payer: MEDICARE

## 2021-01-01 ENCOUNTER — APPOINTMENT (OUTPATIENT)
Dept: VASCULAR SURGERY | Facility: HOSPITAL | Age: 66
End: 2021-01-01

## 2021-01-01 ENCOUNTER — APPOINTMENT (OUTPATIENT)
Dept: CARDIOLOGY | Facility: CLINIC | Age: 66
End: 2021-01-01

## 2021-01-01 ENCOUNTER — APPOINTMENT (OUTPATIENT)
Dept: PODIATRY | Facility: CLINIC | Age: 66
End: 2021-01-01
Payer: MEDICARE

## 2021-01-01 ENCOUNTER — APPOINTMENT (OUTPATIENT)
Dept: RADIATION ONCOLOGY | Facility: CLINIC | Age: 66
End: 2021-01-01

## 2021-01-01 ENCOUNTER — RX RENEWAL (OUTPATIENT)
Age: 66
End: 2021-01-01

## 2021-01-01 ENCOUNTER — APPOINTMENT (OUTPATIENT)
Dept: ENDOCRINOLOGY | Facility: CLINIC | Age: 66
End: 2021-01-01
Payer: MEDICARE

## 2021-01-01 ENCOUNTER — APPOINTMENT (OUTPATIENT)
Dept: CARDIOLOGY | Facility: CLINIC | Age: 66
End: 2021-01-01
Payer: MEDICARE

## 2021-01-01 ENCOUNTER — APPOINTMENT (OUTPATIENT)
Dept: ENDOCRINOLOGY | Facility: CLINIC | Age: 66
End: 2021-01-01

## 2021-01-01 ENCOUNTER — APPOINTMENT (OUTPATIENT)
Dept: GASTROENTEROLOGY | Facility: HOSPITAL | Age: 66
End: 2021-01-01

## 2021-01-01 ENCOUNTER — APPOINTMENT (OUTPATIENT)
Dept: HEMATOLOGY ONCOLOGY | Facility: CLINIC | Age: 66
End: 2021-01-01

## 2021-01-01 ENCOUNTER — APPOINTMENT (OUTPATIENT)
Dept: VASCULAR SURGERY | Facility: CLINIC | Age: 66
End: 2021-01-01
Payer: MEDICARE

## 2021-01-01 ENCOUNTER — APPOINTMENT (OUTPATIENT)
Dept: VASCULAR SURGERY | Facility: CLINIC | Age: 66
End: 2021-01-01

## 2021-01-01 ENCOUNTER — LABORATORY RESULT (OUTPATIENT)
Age: 66
End: 2021-01-01

## 2021-01-01 ENCOUNTER — APPOINTMENT (OUTPATIENT)
Dept: GASTROENTEROLOGY | Facility: CLINIC | Age: 66
End: 2021-01-01

## 2021-01-01 ENCOUNTER — RESULT CHARGE (OUTPATIENT)
Age: 66
End: 2021-01-01

## 2021-01-01 ENCOUNTER — APPOINTMENT (OUTPATIENT)
Dept: PAIN MANAGEMENT | Facility: CLINIC | Age: 66
End: 2021-01-01

## 2021-01-01 VITALS
HEART RATE: 67 BPM | BODY MASS INDEX: 30.35 KG/M2 | WEIGHT: 212 LBS | HEIGHT: 70 IN | BODY MASS INDEX: 30.35 KG/M2 | HEIGHT: 70 IN | WEIGHT: 212 LBS | DIASTOLIC BLOOD PRESSURE: 57 MMHG | RESPIRATION RATE: 16 BRPM | SYSTOLIC BLOOD PRESSURE: 139 MMHG | TEMPERATURE: 98.3 F | OXYGEN SATURATION: 98 %

## 2021-01-01 VITALS — WEIGHT: 217 LBS | HEIGHT: 70 IN | BODY MASS INDEX: 31.07 KG/M2

## 2021-01-01 VITALS
BODY MASS INDEX: 31.21 KG/M2 | SYSTOLIC BLOOD PRESSURE: 110 MMHG | DIASTOLIC BLOOD PRESSURE: 56 MMHG | HEIGHT: 70 IN | RESPIRATION RATE: 16 BRPM | WEIGHT: 218 LBS

## 2021-01-01 VITALS
TEMPERATURE: 98 F | WEIGHT: 215 LBS | HEIGHT: 70 IN | DIASTOLIC BLOOD PRESSURE: 80 MMHG | BODY MASS INDEX: 30.78 KG/M2 | SYSTOLIC BLOOD PRESSURE: 130 MMHG

## 2021-01-01 VITALS
SYSTOLIC BLOOD PRESSURE: 129 MMHG | WEIGHT: 196 LBS | HEART RATE: 52 BPM | OXYGEN SATURATION: 99 % | DIASTOLIC BLOOD PRESSURE: 56 MMHG | RESPIRATION RATE: 18 BRPM | BODY MASS INDEX: 28.12 KG/M2

## 2021-01-01 VITALS
HEIGHT: 70 IN | DIASTOLIC BLOOD PRESSURE: 80 MMHG | BODY MASS INDEX: 30.78 KG/M2 | SYSTOLIC BLOOD PRESSURE: 124 MMHG | WEIGHT: 215 LBS

## 2021-01-01 VITALS — BODY MASS INDEX: 28.06 KG/M2 | WEIGHT: 196 LBS | HEIGHT: 70 IN

## 2021-01-01 VITALS — SYSTOLIC BLOOD PRESSURE: 91 MMHG | DIASTOLIC BLOOD PRESSURE: 60 MMHG | HEART RATE: 45 BPM

## 2021-01-01 VITALS
WEIGHT: 211 LBS | BODY MASS INDEX: 30.21 KG/M2 | DIASTOLIC BLOOD PRESSURE: 62 MMHG | OXYGEN SATURATION: 99 % | TEMPERATURE: 97.6 F | HEART RATE: 58 BPM | SYSTOLIC BLOOD PRESSURE: 122 MMHG | RESPIRATION RATE: 18 BRPM | HEIGHT: 70 IN

## 2021-01-01 VITALS
WEIGHT: 212 LBS | RESPIRATION RATE: 16 BRPM | DIASTOLIC BLOOD PRESSURE: 76 MMHG | SYSTOLIC BLOOD PRESSURE: 130 MMHG | HEIGHT: 70 IN | BODY MASS INDEX: 30.35 KG/M2

## 2021-01-01 VITALS
SYSTOLIC BLOOD PRESSURE: 127 MMHG | OXYGEN SATURATION: 99 % | WEIGHT: 219 LBS | TEMPERATURE: 97.9 F | HEIGHT: 70 IN | DIASTOLIC BLOOD PRESSURE: 74 MMHG | BODY MASS INDEX: 31.35 KG/M2 | RESPIRATION RATE: 18 BRPM | HEART RATE: 60 BPM

## 2021-01-01 VITALS
WEIGHT: 218 LBS | BODY MASS INDEX: 31.21 KG/M2 | OXYGEN SATURATION: 97 % | TEMPERATURE: 97.6 F | HEART RATE: 65 BPM | RESPIRATION RATE: 16 BRPM | DIASTOLIC BLOOD PRESSURE: 67 MMHG | HEIGHT: 70 IN | SYSTOLIC BLOOD PRESSURE: 127 MMHG

## 2021-01-01 VITALS — BODY MASS INDEX: 28.06 KG/M2 | HEIGHT: 70 IN | WEIGHT: 196 LBS

## 2021-01-01 VITALS — HEIGHT: 70 IN | BODY MASS INDEX: 30.78 KG/M2 | WEIGHT: 215 LBS

## 2021-01-01 VITALS
HEIGHT: 70 IN | WEIGHT: 211 LBS | BODY MASS INDEX: 30.21 KG/M2 | SYSTOLIC BLOOD PRESSURE: 130 MMHG | DIASTOLIC BLOOD PRESSURE: 80 MMHG

## 2021-01-01 VITALS
OXYGEN SATURATION: 98 % | SYSTOLIC BLOOD PRESSURE: 116 MMHG | TEMPERATURE: 97.9 F | HEIGHT: 70 IN | BODY MASS INDEX: 30.78 KG/M2 | WEIGHT: 215 LBS | HEART RATE: 60 BPM | DIASTOLIC BLOOD PRESSURE: 52 MMHG | RESPIRATION RATE: 16 BRPM

## 2021-01-01 VITALS
BODY MASS INDEX: 31.07 KG/M2 | DIASTOLIC BLOOD PRESSURE: 70 MMHG | RESPIRATION RATE: 16 BRPM | OXYGEN SATURATION: 98 % | HEIGHT: 70 IN | WEIGHT: 217 LBS | HEART RATE: 58 BPM | SYSTOLIC BLOOD PRESSURE: 160 MMHG

## 2021-01-01 VITALS — WEIGHT: 212 LBS | HEIGHT: 70 IN | BODY MASS INDEX: 30.35 KG/M2

## 2021-01-01 VITALS — HEIGHT: 70 IN | BODY MASS INDEX: 28.06 KG/M2 | WEIGHT: 196 LBS

## 2021-01-01 VITALS
HEIGHT: 70 IN | WEIGHT: 217 LBS | DIASTOLIC BLOOD PRESSURE: 92 MMHG | SYSTOLIC BLOOD PRESSURE: 161 MMHG | BODY MASS INDEX: 31.07 KG/M2 | TEMPERATURE: 97.8 F

## 2021-01-01 VITALS
RESPIRATION RATE: 16 BRPM | HEIGHT: 70 IN | SYSTOLIC BLOOD PRESSURE: 126 MMHG | DIASTOLIC BLOOD PRESSURE: 67 MMHG | WEIGHT: 218 LBS | BODY MASS INDEX: 31.21 KG/M2 | OXYGEN SATURATION: 100 % | HEART RATE: 55 BPM | TEMPERATURE: 97.3 F

## 2021-01-01 VITALS
SYSTOLIC BLOOD PRESSURE: 102 MMHG | BODY MASS INDEX: 22.36 KG/M2 | HEART RATE: 104 BPM | TEMPERATURE: 97.2 F | DIASTOLIC BLOOD PRESSURE: 50 MMHG | OXYGEN SATURATION: 94 % | HEIGHT: 70 IN | RESPIRATION RATE: 16 BRPM | WEIGHT: 156.19 LBS

## 2021-01-01 VITALS — HEIGHT: 70 IN | WEIGHT: 212 LBS | BODY MASS INDEX: 30.35 KG/M2

## 2021-01-01 VITALS
HEIGHT: 70 IN | SYSTOLIC BLOOD PRESSURE: 110 MMHG | BODY MASS INDEX: 29.35 KG/M2 | TEMPERATURE: 97.2 F | DIASTOLIC BLOOD PRESSURE: 80 MMHG | WEIGHT: 205 LBS

## 2021-01-01 VITALS — DIASTOLIC BLOOD PRESSURE: 69 MMHG | SYSTOLIC BLOOD PRESSURE: 115 MMHG | HEART RATE: 60 BPM

## 2021-01-01 DIAGNOSIS — G62.9 POLYNEUROPATHY, UNSPECIFIED: ICD-10-CM

## 2021-01-01 DIAGNOSIS — E11.9 TYPE 2 DIABETES MELLITUS W/OUT COMPLICATIONS: ICD-10-CM

## 2021-01-01 DIAGNOSIS — I50.22 CHRONIC SYSTOLIC (CONGESTIVE) HEART FAILURE: ICD-10-CM

## 2021-01-01 DIAGNOSIS — L97.511 NON-PRESSURE CHRONIC ULCER OF OTHER PART OF RIGHT FOOT LIMITED TO BREAKDOWN OF SKIN: ICD-10-CM

## 2021-01-01 DIAGNOSIS — M79.671 PAIN IN RIGHT FOOT: ICD-10-CM

## 2021-01-01 DIAGNOSIS — C10.9 MALIGNANT NEOPLASM OF OROPHARYNX, UNSPECIFIED: ICD-10-CM

## 2021-01-01 DIAGNOSIS — R13.10 DYSPHAGIA, UNSPECIFIED: ICD-10-CM

## 2021-01-01 DIAGNOSIS — L97.509 TYPE 2 DIABETES MELLITUS WITH FOOT ULCER: ICD-10-CM

## 2021-01-01 DIAGNOSIS — T14.8XXA OTHER INJURY OF UNSPECIFIED BODY REGION, INITIAL ENCOUNTER: ICD-10-CM

## 2021-01-01 DIAGNOSIS — L89.622 PRESSURE ULCER OF LEFT HEEL, STAGE 2: ICD-10-CM

## 2021-01-01 DIAGNOSIS — Z79.4 TYPE 2 DIABETES MELLITUS WITH DIABETIC POLYNEUROPATHY: ICD-10-CM

## 2021-01-01 DIAGNOSIS — M54.2 CERVICALGIA: ICD-10-CM

## 2021-01-01 DIAGNOSIS — B35.1 TINEA UNGUIUM: ICD-10-CM

## 2021-01-01 DIAGNOSIS — E78.5 HYPERLIPIDEMIA, UNSPECIFIED: ICD-10-CM

## 2021-01-01 DIAGNOSIS — S90.822A BLISTER (NONTHERMAL), LEFT FOOT, INITIAL ENCOUNTER: ICD-10-CM

## 2021-01-01 DIAGNOSIS — L97.512 NON-PRESSURE CHRONIC ULCER OF OTHER PART OF RIGHT FOOT WITH FAT LAYER EXPOSED: ICD-10-CM

## 2021-01-01 DIAGNOSIS — I50.9 HEART FAILURE, UNSPECIFIED: ICD-10-CM

## 2021-01-01 DIAGNOSIS — L85.1 ACQUIRED KERATOSIS [KERATODERMA] PALMARIS ET PLANTARIS: ICD-10-CM

## 2021-01-01 DIAGNOSIS — E78.00 PURE HYPERCHOLESTEROLEMIA, UNSPECIFIED: ICD-10-CM

## 2021-01-01 DIAGNOSIS — I25.10 ATHEROSCLEROTIC HEART DISEASE OF NATIVE CORONARY ARTERY W/OUT ANGINA PECTORIS: ICD-10-CM

## 2021-01-01 DIAGNOSIS — R05 COUGH: ICD-10-CM

## 2021-01-01 DIAGNOSIS — Z79.4 TYPE 2 DIABETES MELLITUS WITH DIABETIC PERIPHERAL ANGIOPATHY W/OUT GANGRENE: ICD-10-CM

## 2021-01-01 DIAGNOSIS — E11.22 TYPE 2 DIABETES MELLITUS WITH DIABETIC CHRONIC KIDNEY DISEASE: ICD-10-CM

## 2021-01-01 DIAGNOSIS — N17.9 ACUTE KIDNEY FAILURE, UNSPECIFIED: ICD-10-CM

## 2021-01-01 DIAGNOSIS — N18.30 TYPE 2 DIABETES MELLITUS WITH DIABETIC CHRONIC KIDNEY DISEASE: ICD-10-CM

## 2021-01-01 DIAGNOSIS — L97.522 NON-PRESSURE CHRONIC ULCER OF OTHER PART OF LEFT FOOT WITH FAT LAYER EXPOSED: ICD-10-CM

## 2021-01-01 DIAGNOSIS — C77.9 SECONDARY AND UNSPECIFIED MALIGNANT NEOPLASM OF LYMPH NODE, UNSPECIFIED: ICD-10-CM

## 2021-01-01 DIAGNOSIS — E11.621 TYPE 2 DIABETES MELLITUS WITH FOOT ULCER: ICD-10-CM

## 2021-01-01 DIAGNOSIS — E11.65 TYPE 2 DIABETES MELLITUS WITH UNSPECIFIED COMPLICATIONS: ICD-10-CM

## 2021-01-01 DIAGNOSIS — G89.3 NEOPLASM RELATED PAIN (ACUTE) (CHRONIC): ICD-10-CM

## 2021-01-01 DIAGNOSIS — Z79.4 TYPE 2 DIABETES MELLITUS WITH FOOT ULCER: ICD-10-CM

## 2021-01-01 DIAGNOSIS — E66.9 OBESITY, UNSPECIFIED: ICD-10-CM

## 2021-01-01 DIAGNOSIS — E11.51 TYPE 2 DIABETES MELLITUS WITH DIABETIC PERIPHERAL ANGIOPATHY W/OUT GANGRENE: ICD-10-CM

## 2021-01-01 DIAGNOSIS — G89.29 PAIN IN RIGHT FOOT: ICD-10-CM

## 2021-01-01 DIAGNOSIS — D64.9 ANEMIA, UNSPECIFIED: ICD-10-CM

## 2021-01-01 DIAGNOSIS — L02.612 CUTANEOUS ABSCESS OF LEFT FOOT: ICD-10-CM

## 2021-01-01 DIAGNOSIS — R22.1 LOCALIZED SWELLING, MASS AND LUMP, NECK: ICD-10-CM

## 2021-01-01 DIAGNOSIS — M79.672 PAIN IN LEFT FOOT: ICD-10-CM

## 2021-01-01 DIAGNOSIS — E11.42 TYPE 2 DIABETES MELLITUS WITH DIABETIC POLYNEUROPATHY: ICD-10-CM

## 2021-01-01 DIAGNOSIS — Z98.890 PERIPHERAL VASCULAR DISEASE, UNSPECIFIED: ICD-10-CM

## 2021-01-01 DIAGNOSIS — I73.9 PERIPHERAL VASCULAR DISEASE, UNSPECIFIED: ICD-10-CM

## 2021-01-01 DIAGNOSIS — I10 ESSENTIAL (PRIMARY) HYPERTENSION: ICD-10-CM

## 2021-01-01 DIAGNOSIS — E11.8 TYPE 2 DIABETES MELLITUS WITH UNSPECIFIED COMPLICATIONS: ICD-10-CM

## 2021-01-01 DIAGNOSIS — G89.29 PAIN IN LEFT FOOT: ICD-10-CM

## 2021-01-01 LAB
BACTERIA WND CULT: ABNORMAL
GLUCOSE BLDC GLUCOMTR-MCNC: 184
GLUCOSE BLDC GLUCOMTR-MCNC: 197
GLUCOSE BLDC GLUCOMTR-MCNC: 374

## 2021-01-01 PROCEDURE — 82962 GLUCOSE BLOOD TEST: CPT

## 2021-01-01 PROCEDURE — 99213 OFFICE O/P EST LOW 20 MIN: CPT

## 2021-01-01 PROCEDURE — 11042 DBRDMT SUBQ TIS 1ST 20SQCM/<: CPT

## 2021-01-01 PROCEDURE — 11056 PARNG/CUTG B9 HYPRKR LES 2-4: CPT | Mod: Q8

## 2021-01-01 PROCEDURE — 95251 CONT GLUC MNTR ANALYSIS I&R: CPT

## 2021-01-01 PROCEDURE — 99215 OFFICE O/P EST HI 40 MIN: CPT | Mod: 25

## 2021-01-01 PROCEDURE — 11721 DEBRIDE NAIL 6 OR MORE: CPT | Mod: 59

## 2021-01-01 PROCEDURE — 10160 PNXR ASPIR ABSC HMTMA BULLA: CPT | Mod: 59,RT

## 2021-01-01 PROCEDURE — 99214 OFFICE O/P EST MOD 30 MIN: CPT

## 2021-01-01 PROCEDURE — 99215 OFFICE O/P EST HI 40 MIN: CPT

## 2021-01-01 PROCEDURE — 99024 POSTOP FOLLOW-UP VISIT: CPT

## 2021-01-01 PROCEDURE — 99213 OFFICE O/P EST LOW 20 MIN: CPT | Mod: 25

## 2021-01-01 PROCEDURE — 10061 I&D ABSCESS COMP/MULTIPLE: CPT | Mod: LT

## 2021-01-01 PROCEDURE — 99214 OFFICE O/P EST MOD 30 MIN: CPT | Mod: 25

## 2021-01-01 PROCEDURE — 93000 ELECTROCARDIOGRAM COMPLETE: CPT

## 2021-01-01 PROCEDURE — G0447 BEHAVIOR COUNSEL OBESITY 15M: CPT | Mod: 59

## 2021-01-01 RX ORDER — FUROSEMIDE 40 MG/1
40 TABLET ORAL DAILY
Refills: 0 | Status: DISCONTINUED | COMMUNITY
Start: 2020-08-31 | End: 2021-01-01

## 2021-01-01 RX ORDER — ISOSORBIDE MONONITRATE 60 MG/1
60 TABLET, EXTENDED RELEASE ORAL DAILY
Qty: 90 | Refills: 3 | Status: COMPLETED | COMMUNITY
End: 2021-01-01

## 2021-01-01 RX ORDER — FENTANYL 12 UG/H
12 PATCH, EXTENDED RELEASE TRANSDERMAL
Qty: 1 | Refills: 0 | Status: ACTIVE | COMMUNITY
Start: 2021-01-01 | End: 1900-01-01

## 2021-01-01 RX ORDER — TRAMADOL HYDROCHLORIDE 50 MG/1
50 TABLET, COATED ORAL
Qty: 30 | Refills: 0 | Status: COMPLETED | COMMUNITY
Start: 2021-01-01 | End: 2021-01-01

## 2021-01-01 RX ORDER — SODIUM POLYSTYRENE SULFONATE 4.1 MEQ/G
POWDER, FOR SUSPENSION ORAL; RECTAL
Qty: 454 | Refills: 0 | Status: ACTIVE | COMMUNITY
Start: 2021-01-01

## 2021-01-01 RX ORDER — INSULIN ASPART 100 [IU]/ML
100 INJECTION, SOLUTION INTRAVENOUS; SUBCUTANEOUS
Qty: 2 | Refills: 1 | Status: COMPLETED | COMMUNITY
Start: 2020-08-24 | End: 2021-01-01

## 2021-01-01 RX ORDER — TIZANIDINE 2 MG/1
2 TABLET ORAL
Qty: 90 | Refills: 0 | Status: ACTIVE | COMMUNITY
Start: 2021-01-01 | End: 1900-01-01

## 2021-01-01 RX ORDER — INSULIN GLARGINE 100 [IU]/ML
100 INJECTION, SOLUTION SUBCUTANEOUS
Qty: 15 | Refills: 0 | Status: DISCONTINUED | COMMUNITY
Start: 2020-04-12 | End: 2021-01-01

## 2021-01-01 RX ORDER — CEPHALEXIN 500 MG/1
500 CAPSULE ORAL 4 TIMES DAILY
Qty: 40 | Refills: 1 | Status: DISCONTINUED | COMMUNITY
Start: 2021-01-01 | End: 2021-01-01

## 2021-01-01 RX ORDER — DOXYCYCLINE HYCLATE 100 MG/1
100 CAPSULE ORAL TWICE DAILY
Qty: 20 | Refills: 1 | Status: ACTIVE | COMMUNITY
Start: 2021-01-01 | End: 1900-01-01

## 2021-01-01 RX ORDER — AMLODIPINE BESYLATE 10 MG/1
10 TABLET ORAL
Qty: 90 | Refills: 0 | Status: DISCONTINUED | COMMUNITY
Start: 2021-01-01 | End: 2021-01-01

## 2021-01-01 RX ORDER — HYDROCODONE BITARTRATE AND ACETAMINOPHEN 7.5; 3 MG/1; MG/1
7.5-3 TABLET ORAL
Qty: 30 | Refills: 0 | Status: ACTIVE | COMMUNITY
Start: 1900-01-01 | End: 1900-01-01

## 2021-01-01 RX ORDER — BLOOD SUGAR DIAGNOSTIC
STRIP MISCELLANEOUS
Qty: 4 | Refills: 1 | Status: ACTIVE | COMMUNITY
Start: 2020-08-06 | End: 1900-01-01

## 2021-01-01 RX ORDER — INSULIN DEGLUDEC INJECTION 200 U/ML
200 INJECTION, SOLUTION SUBCUTANEOUS
Qty: 3 | Refills: 1 | Status: ACTIVE | COMMUNITY
Start: 2020-08-06 | End: 1900-01-01

## 2021-01-01 RX ORDER — URSODIOL 300 MG/1
300 CAPSULE ORAL
Refills: 0 | Status: ACTIVE | COMMUNITY

## 2021-01-01 RX ORDER — ATORVASTATIN CALCIUM 40 MG/1
40 TABLET, FILM COATED ORAL
Refills: 0 | Status: ACTIVE | COMMUNITY

## 2021-01-01 RX ORDER — INSULIN LISPRO 100 [IU]/ML
100 INJECTION, SOLUTION INTRAVENOUS; SUBCUTANEOUS
Refills: 0 | Status: ACTIVE | COMMUNITY

## 2021-01-01 RX ORDER — HYDROCODONE BITARTRATE AND ACETAMINOPHEN 7.5; 3 MG/1; MG/1
7.5-3 TABLET ORAL
Refills: 0 | Status: ACTIVE | COMMUNITY

## 2021-01-01 RX ORDER — BLOOD-GLUCOSE METER
W/DEVICE KIT MISCELLANEOUS
Qty: 1 | Refills: 0 | Status: DISCONTINUED | COMMUNITY
Start: 2020-08-06 | End: 2021-01-01

## 2021-01-01 RX ORDER — AMLODIPINE BESYLATE 5 MG/1
5 TABLET ORAL
Qty: 90 | Refills: 3 | Status: ACTIVE | COMMUNITY
Start: 1900-01-01 | End: 1900-01-01

## 2021-01-01 RX ORDER — DULOXETINE HYDROCHLORIDE 60 MG/1
60 CAPSULE, DELAYED RELEASE PELLETS ORAL
Qty: 30 | Refills: 0 | Status: DISCONTINUED | COMMUNITY
Start: 2020-06-25 | End: 2021-01-01

## 2021-01-01 RX ORDER — DULAGLUTIDE 0.75 MG/.5ML
0.75 INJECTION, SOLUTION SUBCUTANEOUS
Qty: 6 | Refills: 0 | Status: ACTIVE | COMMUNITY
Start: 2021-01-01 | End: 1900-01-01

## 2021-01-01 RX ORDER — AMLODIPINE BESYLATE 5 MG/1
5 TABLET ORAL
Refills: 0 | Status: COMPLETED | COMMUNITY
End: 2021-01-01

## 2021-01-01 RX ORDER — LANCETS
EACH MISCELLANEOUS
Qty: 400 | Refills: 1 | Status: DISCONTINUED | COMMUNITY
Start: 2020-08-06 | End: 2021-01-01

## 2021-01-01 RX ORDER — MUPIROCIN 20 MG/G
2 OINTMENT TOPICAL
Qty: 1 | Refills: 2 | Status: ACTIVE | COMMUNITY
Start: 2021-01-01 | End: 1900-01-01

## 2021-01-01 RX ORDER — GABAPENTIN 600 MG/1
600 TABLET, COATED ORAL 3 TIMES DAILY
Qty: 180 | Refills: 0 | Status: ACTIVE | COMMUNITY
Start: 2020-08-06 | End: 1900-01-01

## 2021-01-01 RX ORDER — METOPROLOL TARTRATE 100 MG/1
100 TABLET, FILM COATED ORAL
Qty: 180 | Refills: 0 | Status: DISCONTINUED | COMMUNITY
Start: 2019-12-19 | End: 2021-01-01

## 2021-01-07 NOTE — HISTORY OF PRESENT ILLNESS
[FreeTextEntry1] : 64-year-old male with recurrent HPV head and neck cancer initially diagnosed in May 2019 with worsening pain and neck swelling tightness or tongue cancer in May 2019 offered chemotherapy and radiation patient was not deemed a candidate for surgery. Patient is a history of acute congestive heart failure associated with acute hypoxemic respiratory failure and diabetes also has a history of coronary artery disease and has had surgery in the past - Patient unable to provide clear history. \par  he also has a history of bilateral carotid endarterectomy.  He was at the hospital August 2020 for acute heart failure exacerbation\par \par Since last visit - pt complains of chest pain.

## 2021-01-07 NOTE — ASSESSMENT
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 Will set up at [facility].\par Patient is on aspirin 81 mg.\par

## 2021-01-26 NOTE — HISTORY OF PRESENT ILLNESS
[___ mths] : [unfilled] month(s) ago [Paroxysmal] : paroxysmal [7] : a maximum pain level of 7/10 [Sharp] : sharp [Medications] : medications [FreeTextEntry1] : As per my  note dated 06/25/20: "63 yom w/ head and neck cancer managed by Dr. Velasquez presents w/ left sided neck and jaw pain. He has a history of alcoholism and is concerned with trying new medications. He does have relief with gabapentin which she also takes for diabetic neuropathy. Pain is worse with activity. Pain has been increasing."\par \par As per my note dated, 08/06/20. "Since his last visit he was admitted to [place] for hypotension, unknown cause. Reports that he takes sometimes 4-5 gabapentin at a time, no side effects. He has good days and bad days with his jaw. Pain is reasonably well-controlled on the good days."\par \par As per my note dated, 09/09/20. "He reports that the gabapentin is helping him tremendously. Does not wish to change medications."\par \par As per my note dated, 10/14/20.  "He reports that he is feeling well with his gabapentin. He has had issues with his glucose and potassium. Does not wish to change medications."\par \par Pt returns for follow-up today, 01/26/21. He reports that gabapentin is helping with is pain. Recently had a cardiac stent placed. Pain is well-controlled.   [FreeTextEntry2] : 21 [FreeTextEntry7] : left side of ear into throat [FreeTextEntry3] : n/a

## 2021-01-26 NOTE — ASSESSMENT
[FreeTextEntry1] : 64 yom w/ head and neck cancer managed by Dr. Velasquez presents w/ left sided neck and jaw pain. As he is doing very well with gabapentin to 1200 mg TID, continue for now. RTC one month.

## 2021-01-26 NOTE — PHYSICAL EXAM
[de-identified] : Constitutional: Well-developed, in no acute distress\par Eyes: Pupil- Right: normal, Left: normal\par Neck exam: Inspection normal\par Respiratory: Normal effort, speaking in full sentences\par Cardiovascular: Regular rate and rhythm\par Vascular: Dorsal pedis pulses normal and equal bilaterally\par Abdomen: Inspection normal, no distension\par Skin: Inspection normal, no bruising noted\par Musculoskeletal:\par Cervical Spine:   \par Gait: Antalgic\par Inspection: Normal curvature, no abnormal kyphosis or scoliosis\par \par Facet loading: pain bilaterally\par \par Palpation:\par Cervical paraspinal muscles: pain bilaterally\par 		\par Muscle Strength:\par Deltoid: 5/5 bilaterally\par Biceps: 5/5 bilaterally\par Triceps: 5/5 bilaterally\par Adductor pollicis: 5/5 bilaterally\par \par Sensation: normal and equal in bilateral upper extremities\par \par Reflexes:\par Biceps (C5): 2+ bilaterally\par Brachioradialis (C6): 2+ bilaterally\par Triceps (C7): 2+ bilaterally\par \par Extremity: no edema noted\par Neurological: Memory normal, AAO x 3, Cranial nerves II - XII grossly normal\par Psychiatric: Appropriate mood and affect, oriented to time, place, person, and situation\par \par \par

## 2021-02-17 NOTE — ASSESSMENT
[FreeTextEntry1] : Using sterile instrumentation debridement of all nails manually and electrically to decrease thickness, pain and girth and make shoe gear more comfortable with "slant back" procedure of any bordering spicules causing pain\par Utilizing a scalpel and sterile aseptic technique sharp debridement of multiple hyperkeratotic lesions performed. Appropriate padding were necessary.\par \par A complete and thorough evaluation of the type of shoes they should be wearing and type of shoes for this time of year was discussed with patient.\par \par

## 2021-02-17 NOTE — PHYSICAL EXAM
[General Appearance - Alert] : alert [General Appearance - In No Acute Distress] : in no acute distress [TWNoteComboBox1] : False [TWNoteComboBox2] : False [TWNoteComboBox3] : False [TWNoteComboBox4] : False [de-identified] : False [TWNoteComboBox6] : False [FreeTextEntry1] : The patient has all contributing factors to onychomycosis including but not limited to thickness, subungual debris, discoloration and partial lysis and they are brittle when cut.    Painful hyperkeratotic lesions noted sub 5 bilatewral

## 2021-02-24 NOTE — ASSESSMENT
[FreeTextEntry1] : 66 yo male referred by Dr. 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 Patient has palpable left cervical adenopathy. \par \par Patient offered chemo/ RT - agreed for RT only, received 3000 cGy of 7000 cGY, stopped the treatment b/o poor tolerance in October 2019. \par \par  Presented in April 2020 with POD -Left side of the neck - induration at the base of the neck \par Tumor p16 positive\par  Foudation One- PDL-1 1- ( positive), MS - stable, TMB- 4,  PIK3CA amplification ( alpelisib), CCND1 amplification ( abemaciclib) \par  Patient evaluated by Dr. YING Ureña - not a candidate for surgery\par - Refered to Dr. Thapa for evaluation for palliative RT - declined \par - restaging PETCT 3/2020 reviewed- locally advanced disease, with increased pain\par - Pembrolizumab - cycle 2 4/23/2020, cycle 3- 5/14/2020, cycle 4 6/4/2020, cycle 6 /16/2020, 7/16/2020\par - BP decreased - patient on Losartan/ Metoprolol, off meds - with hyperkalemia, hyponatremia \par \par 8/6/2020- progression of the disease, hyperglycemia\par - 8/13/2020 - week 1  carbo/ taxol weekly.  SIde effects explained to patient \par - pain controlled, weight stable, increased left base of the neck adenopathy, with impending ulceration, offered RT again, declined.  Explained the POD with risk of ulceration from rapidly growing cancer. \par - when diabetes under control will rx PIQRAY\par 8/27/2020 blood pressure (100/54) and hgb (8.5) downtrending possibly due to slow bleed, WBC 2.2 with manual ANC 1.1; patient refusing treatment as fearful of counts dropping further.  patient refusing blood transfusion at this time, but agreeable to receiving IV fluids.  BUN and creatinine increasing with hyperkalemia (5.7)\par patient told recurrent mass +/- drainage likely due to progression of disease, he continues to refuse radiation treatments\par 8/31/2020 treatment held, patient sent to ED for hyperkalemia 6.0 (last 5.7 8/27/2020), hgb 8.9, WBC 3.2, CRP increasing now 17.4\par 9/10/2020 taxo/carbo weekly; referral to see Dr. Pina for ongoing hyperkalemia and worsening renal function\par 9/17/2020 taxo/carbo #4 continues, left neck mass responding to treatment, patient needs to see Dr. Pina renal function remains compromised and hyperkalemia (5.8) continues\par 9/24/2020 treatment held due to cardiac symptoms, troponin checked (27) has been elevated previously, Dr Olson notified of symptoms and patient told to call his office should the angina worsen.  Weakness may be cardiac related, blood pressure also slightly lower than normal; need to follow up with Dr. Pina\par 10/9 /2020  Feels better, decreased mass at the base of the neck.  Stable PO Intake.  Undergoing cardiology evaluation.  Hyperkalemia- repeat testing today. carbo/ taxol weekly day 1 today. \par 10/15/2020- Taxol/ carbo cycle 3 - total dose Taxol x 6\par 11/5/2020 - cycle 4 - carbo/ taxol day 1, 8.  Developing neuropathy- will lower the dose and complete after cycle 4 restaging PETCT  \par 11/12/2020- c/o cough, increased at night, RX PPI last week, he is unclear if picked up.  \par INcreased neuropathy in hands, feet, - hold chemo today\par Hg 8.9 - monitor \par \par 11/19/2020\par PETCT with resolution of malignancy/ adenopathy- hold chemo for now\par - GI / Surgery/ cardio evaluation for cholecystectomy. \par  Neuropathy - on gabapentin \par GERD and reflux - start PPI \par \par 12/23/2020\par s/p cholecystectomy\par Hg 10.5 - better\par Still c/o neuropathy - dropping objects, declined OT\par PETCT - Nov 14, 2020- stable \par RTC 6-8 weeks\par Labs- TSH, CEA, \par \par 2/24/2021\par patient completed treatment\par due for PET restaging in March 2021\par s/p stent placement 2-3 weeks ago [facility]- \par has mild chest discomfort at times- no SOB vitals stable- refuses ER visit- called yesterday by [facility] staff was told normal post procedure- add troponins\par needs follow up with Dr. Rutherford- attempted to secure apt with him this week- will report to ER with increasing chest pain- tightness and or dypnea\par \par Pt to return in April  to discuss PET CT - cbc, chem, cea, irons \par \par

## 2021-02-24 NOTE — HISTORY OF PRESENT ILLNESS
[de-identified] : 63 year old male presents today for new diagnosis of head and neck cancer, referred by Dr. Kaur, ENT.  Mr. Camacho was sent to ENT for a left neck mass with associated left tongue base.  He reported a 3 month history of constant, moderate progressive left sided throat pain with radiating otalgia.  Incidentally it was noted on neck US with vascular surgeon to have a left neck mass.  He has a long standing history of cigarette smoking 5- 6 cigarettes per day and uses alcohol in past however has quit x 5 years.  CT scan done 5/16/19 showed a 5.5cm multiloculated solid and cystic mass within the left latera; neck soft tissues.  Findings may reflect primary neoplasm versus conglomerate of left level 3 cystic/necrotic lymphadenopathy.  Additional abnormal left level 2-4 lymphadenopathy.  Nonspecific prominence of the left lingual tonsils with enhancing soft tissue involving the left oropharyngeal lateral wall.  Regional effacement of fat planes laterally involving the left submandibular gland and posteriorly involving the carotid sheath.  Mild asymmetric enlargement of the left submandibular gland.  PET CT is recommend.  He has not had a biopsy.  He has complaints of a sore throat, with left ear pain at times and new onset of headaches.  \par \par Patient was adopted so he does not know his family history [de-identified] : Patient presents today for follow up of head and neck cancer. Here for treatment today- s/p stent placement at [facility] 2 weeks ago

## 2021-02-24 NOTE — REVIEW OF SYSTEMS
[Recent Change In Weight] : ~T no recent weight change [Dysphagia] : no dysphagia [Diarrhea] : no diarrhea [FreeTextEntry2] : worsening [FreeTextEntry5] : improving

## 2021-02-24 NOTE — CONSULT LETTER
[FreeTextEntry3] : Ana Velasquez MD\par [facility]\par

## 2021-02-24 NOTE — PHYSICAL EXAM
[de-identified] : left posterior palate - induration together with left tonsillar infiltration, Left cervical adenopathy; improving [de-identified] : BLE edema R>L, improving [de-identified] : left neck base mass significantly decreased in size with red with less erythema, less cystic in appearance, less firm, no pain to palpation, no drainage noted

## 2021-02-26 PROBLEM — E78.00 HIGH CHOLESTEROL: Status: ACTIVE | Noted: 2018-11-28

## 2021-02-26 PROBLEM — I50.9 ACUTE CHF (CONGESTIVE HEART FAILURE): Status: ACTIVE | Noted: 2020-08-31

## 2021-02-26 PROBLEM — I25.10 CAD (CORONARY ARTERY DISEASE): Status: ACTIVE | Noted: 2021-01-01

## 2021-02-26 NOTE — HISTORY OF PRESENT ILLNESS
[FreeTextEntry1] : 65-year-old male with recurrent HPV head and neck cancer initially diagnosed in May 2019 with worsening pain and neck swelling tightness or tongue cancer in May 2019 offered chemotherapy and radiation patient was not deemed a candidate for surgery. Patient is a history of acute congestive heart failure associated with acute hypoxemic respiratory failure and diabetes also has a history of coronary artery disease and has had surgery in the past - Patient unable to provide clear history. \par  he also has a history of bilateral carotid endarterectomy.  He was at the hospital August 2020 for acute heart failure exacerbation\par \par Since last visit - underwent SVG to om1 with 80% distal lesion at anastomosis.

## 2021-02-26 NOTE — ASSESSMENT
[FreeTextEntry1] : EKG September 2020 sinus rhythm first degree AV block nonspecific ST abnormality, old inferior infarct with T wave inversion consider lateral ischemia.\par \par heart failure and cardiac history\par \par stress test - sept inferior infarction\par \par Cath - \par s/p pci to svg to om1 3/.0 x 18 mm resolute miranda\par lima to lad patents\par svg to rpda is patent\par \par asa and plavix\par \par increase imdur to 60 mg\par lower amlodpine to 5 mg \par rx sent. \par \par smoking cessation\par \par cardiac rehab\par TST\par \par svg to rpda is open if however pt has ongoing chest pain - consider eval of distal RCA stenosis \par f/u 4 weeks\par for Blood pressure, chest pain and edema on right > left side. \par total time 30 min

## 2021-03-10 NOTE — ASSESSMENT
[FreeTextEntry1] : 65 yom w/ head and neck cancer managed by Dr. Velasquez presents w/ left sided neck and jaw pain. As he is doing very well with gabapentin to 1200 mg TID, continue for now. Control of his diabetes will be essential to improving his neuropathic pain. HEP. RTC one month.

## 2021-03-10 NOTE — HISTORY OF PRESENT ILLNESS
[___ mths] : [unfilled] month(s) ago [Paroxysmal] : paroxysmal [Sharp] : sharp [Medications] : medications [7] : 3. What number best describes how, during the past week, pain has interfered with your general activity? 7/10 pain [FreeTextEntry1] : As per my  note dated 06/25/20: "63 yom w/ head and neck cancer managed by Dr. Velasquez presents w/ left sided neck and jaw pain. He has a history of alcoholism and is concerned with trying new medications. He does have relief with gabapentin which she also takes for diabetic neuropathy. Pain is worse with activity. Pain has been increasing."\par \par As per my note dated, 08/06/20. "Since his last visit he was admitted to [place] for hypotension, unknown cause. Reports that he takes sometimes 4-5 gabapentin at a time, no side effects. He has good days and bad days with his jaw. Pain is reasonably well-controlled on the good days."\par \par As per my note dated, 09/09/20. "He reports that the gabapentin is helping him tremendously. Does not wish to change medications."\par \par As per my note dated, 10/14/20.  "He reports that he is feeling well with his gabapentin. He has had issues with his glucose and potassium. Does not wish to change medications."\par \par As per my note dated, 01/26/21. "He reports that gabapentin is helping with is pain. Recently had a cardiac stent placed. Pain is well-controlled."  \par \par Pt returns for follow-up today, 03/10/21. He reports that his pain is at baseline. He is having worsening pain in his hands and feet. He wishes to maintain his current pain medications.  [FreeTextEntry7] : left side of ear into throat [FreeTextEntry3] : n/a [FreeTextEntry2] : 21

## 2021-03-10 NOTE — PHYSICAL EXAM
[de-identified] : Constitutional: Well-developed, in no acute distress\par Eyes: Pupil- Right: normal, Left: normal\par Neck exam: Inspection normal\par Respiratory: Normal effort, speaking in full sentences\par Cardiovascular: Regular rate and rhythm\par Vascular: Dorsal pedis pulses normal and equal bilaterally\par Abdomen: Inspection normal, no distension\par Skin: Inspection normal, no bruising noted\par Musculoskeletal:\par Cervical Spine:   \par Gait: Antalgic\par Inspection: Normal curvature, no abnormal kyphosis or scoliosis\par \par Facet loading: pain bilaterally\par \par Palpation:\par Cervical paraspinal muscles: pain bilaterally\par 		\par Muscle Strength:\par Deltoid: 5/5 bilaterally\par Biceps: 5/5 bilaterally\par Triceps: 5/5 bilaterally\par Adductor pollicis: 5/5 bilaterally\par \par Sensation: normal and equal in bilateral upper extremities\par \par Reflexes:\par Biceps (C5): 2+ bilaterally\par Brachioradialis (C6): 2+ bilaterally\par Triceps (C7): 2+ bilaterally\par \par Extremity: no edema noted\par Neurological: Memory normal, AAO x 3, Cranial nerves II - XII grossly normal\par Psychiatric: Appropriate mood and affect, oriented to time, place, person, and situation\par \par \par

## 2021-04-05 PROBLEM — E66.9 MILD OBESITY: Status: ACTIVE | Noted: 2020-08-17

## 2021-04-05 NOTE — HISTORY OF PRESENT ILLNESS
[FreeTextEntry1] : Mr. 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 \par has been eating worse lately \par may start on Alpelisib\par type 2 DM for  at least 6  years complicated by     neuropathy       \par on Lantus 26 units. taken off oral diabetes meeds by his last endocrinologist\par Novolog 6units actid \par took it last night \par \par Last HgA1c 8/2020 10.6\par \par Checks BS none \par \par Microvascular complications:\par Nephropathy denies last Cr at goal WNL \par Neuropathy  symptoms, yes big toe amputation 2014\par microfilament exam \par Retinopathy        last eye exam over a year Dr Italia Chavez [phone] [facility]\par \par Macrovascular complications:\par \par HTN at goal \par CAD/CVA  yes [place] sees Dr Wilmer Vaca [facility]\par Lipids  not available   on  Atorvastatin  with  LFT's normal 8/2020 used to be a heavy drinker last 6yrs ago per pt \par \par

## 2021-04-05 NOTE — HISTORY OF PRESENT ILLNESS
[FreeTextEntry1] : Mr. 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 \par has been eating worse lately \par may start on Alpelisib\par type 2 DM for  at least 6  years complicated by     neuropathy       \par on Lantus 26 units. taken off oral diabetes meeds by his last endocrinologist\par Novolog 6units actid \par took it last night \par \par Last HgA1c 8/2020 10.6\par \par Checks BS none \par \par Microvascular complications:\par Nephropathy denies last Cr at goal WNL \par Neuropathy  symptoms, yes big toe amputation 2014\par microfilament exam \par Retinopathy        last eye exam over a year Dr Italia Chavez [phone] [facility]\par \par Macrovascular complications:\par \par HTN at goal \par CAD/CVA  yes [place] sees Dr Wilmer Vaca [facility]\par Lipids  not available   on  Atorvastatin  with  LFT's normal 8/2020 used to be a heavy drinker last 6yrs ago per pt \par \par

## 2021-04-05 NOTE — ASSESSMENT
[Diabetes Foot Care] : diabetes foot care [Long Term Vascular Complications] : long term vascular complications of diabetes [Carbohydrate Consistent Diet] : carbohydrate consistent diet [Importance of Diet and Exercise] : importance of diet and exercise to improve glycemic control, achieve weight loss and improve cardiovascular health [Hypoglycemia Management] : hypoglycemia management [Self Monitoring of Blood Glucose] : self monitoring of blood glucose [Retinopathy Screening] : Patient was referred to ophthalmology for retinopathy screening [Weight Loss] : weight loss [Diabetic Medications] : Risks and benefits of diabetic medications were discussed [Other____] : [unfilled] [Injection Technique, Storage, Sharps Disposal] : injection technique, storage, and sharps disposal

## 2021-04-22 PROBLEM — M79.672 CHRONIC PAIN IN LEFT FOOT: Status: ACTIVE | Noted: 2019-02-18

## 2021-04-22 PROBLEM — M79.671 CHRONIC PAIN IN RIGHT FOOT: Status: ACTIVE | Noted: 2019-02-18

## 2021-04-22 PROBLEM — B35.1 DERMATOPHYTOSIS, NAIL: Status: ACTIVE | Noted: 2018-11-28

## 2021-04-22 PROBLEM — L85.1 ACQUIRED KERATODERMA: Status: ACTIVE | Noted: 2018-11-28

## 2021-04-22 NOTE — PHYSICAL EXAM
[General Appearance - Alert] : alert [General Appearance - In No Acute Distress] : in no acute distress [FreeTextEntry1] : The patient has all contributing factors to onychomycosis including but not limited to thickness, subungual debris, discoloration and partial lysis and they are brittle when cut.    Painful hyperkeratotic lesions noted sub 5 bilateral

## 2021-04-28 NOTE — PHYSICAL EXAM
[de-identified] : Constitutional: Well-developed, in no acute distress\par \par Skin: Inspection normal, no bruising noted\par Musculoskeletal:\par Cervical Spine:   \par Gait: Antalgic\par Inspection: Normal curvature, no abnormal kyphosis or scoliosis\par \par Extremity: no edema noted\par Neurological: Memory normal, AAO x 3, Cranial nerves II - XII grossly normal\par Psychiatric: Appropriate mood and affect, oriented to time, place, person, and situation

## 2021-04-28 NOTE — ASSESSMENT
[FreeTextEntry1] : 65 yom w/ head and neck cancer managed by Dr. Velasquez presents w/ left sided neck and jaw pain. There has been a severe exacerbation of the patient's chronic pain\par \par As he is doing very well with gabapentin to 1200 mg TID, continue for now.\par \par Start tizanidine 2 mg q8h prn for new acute pain, if no relief, we will rotate to nortriptyline.\par \par Acetaminophen prn. \par \par F/u after PET results and plan for treatment with Dr. CIFUENTES

## 2021-04-28 NOTE — HISTORY OF PRESENT ILLNESS
[___ mths] : [unfilled] month(s) ago [Paroxysmal] : paroxysmal [Sharp] : sharp [Medications] : medications [7] : 3. What number best describes how, during the past week, pain has interfered with your general activity? 7/10 pain [FreeTextEntry1] : Interval History:\par Patient returns for follow-up today. He continues using gabapentin. His pain in his left jaw is worsening. He has a PET scan scheduled but believes he will need chemotherapy again, is seeing Dr. CIFUENTES tomorrow. \par \par As per my  note dated 06/25/20: "63 yom w/ head and neck cancer managed by Dr. Velasquez presents w/ left sided neck and jaw pain. He has a history of alcoholism and is concerned with trying new medications. He does have relief with gabapentin which she also takes for diabetic neuropathy. Pain is worse with activity. Pain has been increasing."\par \par As per my note dated, 08/06/20. "Since his last visit he was admitted to [place] for hypotension, unknown cause. Reports that he takes sometimes 4-5 gabapentin at a time, no side effects. He has good days and bad days with his jaw. Pain is reasonably well-controlled on the good days."\par \par As per my note dated, 09/09/20. "He reports that the gabapentin is helping him tremendously. Does not wish to change medications."\par \par As per my note dated, 10/14/20.  "He reports that he is feeling well with his gabapentin. He has had issues with his glucose and potassium. Does not wish to change medications."\par \par As per my note dated, 01/26/21. "He reports that gabapentin is helping with is pain. Recently had a cardiac stent placed. Pain is well-controlled."   [FreeTextEntry7] : left side of ear into throat [FreeTextEntry3] : n/a [FreeTextEntry2] : 21

## 2021-05-11 PROBLEM — R13.10 ODYNOPHAGIA: Status: ACTIVE | Noted: 2020-04-01

## 2021-05-11 NOTE — REVIEW OF SYSTEMS
[Lower Ext Edema] : lower extremity edema [Negative] : Heme/Lymph [Fatigue] : fatigue [Recent Change In Weight] : ~T recent weight change [Dysphagia] : no dysphagia [Diarrhea] : no diarrhea [FreeTextEntry4] : Left ear ache, difficulty swallowing

## 2021-05-11 NOTE — PHYSICAL EXAM
[Fully active, able to carry on all pre-disease performance without restriction] : Status 0 - Fully active, able to carry on all pre-disease performance without restriction [Normal] : affect appropriate [de-identified] : left posterior palate - induration together with left tonsillar infiltration, Left cervical adenopathy; improving [de-identified] : BLE edema R>L, improving [de-identified] : left neck base mass significantly decreased in size with red with less erythema, less cystic in appearance, less firm, no pain to palpation, no drainage noted

## 2021-05-11 NOTE — ASSESSMENT
[FreeTextEntry1] : 64 yo male referred by Dr. 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 Patient has palpable left cervical adenopathy. \par \par Patient offered chemo/ RT - agreed for RT only, received 3000 cGy of 7000 cGY, stopped the treatment b/o poor tolerance in October 2019. \par \par  Presented in April 2020 with POD -Left side of the neck - induration at the base of the neck \par Tumor p16 positive\par  Foudation One- PDL-1 1- ( positive), MS - stable, TMB- 4,  PIK3CA amplification ( alpelisib), CCND1 amplification ( abemaciclib) \par  Patient evaluated by Dr. YING Ureña - not a candidate for surgery\par - Refered to Dr. Thapa for evaluation for palliative RT - declined \par - restaging PETCT 3/2020 reviewed- locally advanced disease, with increased pain\par - Pembrolizumab - cycle 2 4/23/2020, cycle 3- 5/14/2020, cycle 4 6/4/2020, cycle 6 /16/2020, 7/16/2020\par - BP decreased - patient on Losartan/ Metoprolol, off meds - with hyperkalemia, hyponatremia \par \par 8/6/2020- progression of the disease, hyperglycemia\par - 8/13/2020 - week 1  carbo/ taxol weekly.  SIde effects explained to patient \par - pain controlled, weight stable, increased left base of the neck adenopathy, with impending ulceration, offered RT again, declined.  Explained the POD with risk of ulceration from rapidly growing cancer. \par - when diabetes under control will rx PIQRAY\par 8/27/2020 blood pressure (100/54) and hgb (8.5) downtrending possibly due to slow bleed, WBC 2.2 with manual ANC 1.1; patient refusing treatment as fearful of counts dropping further.  patient refusing blood transfusion at this time, but agreeable to receiving IV fluids.  BUN and creatinine increasing with hyperkalemia (5.7)\par patient told recurrent mass +/- drainage likely due to progression of disease, he continues to refuse radiation treatments\par 8/31/2020 treatment held, patient sent to ED for hyperkalemia 6.0 (last 5.7 8/27/2020), hgb 8.9, WBC 3.2, CRP increasing now 17.4\par 9/10/2020 taxo/carbo weekly; referral to see Dr. Pina for ongoing hyperkalemia and worsening renal function\par 9/17/2020 taxo/carbo #4 continues, left neck mass responding to treatment, patient needs to see Dr. Pina renal function remains compromised and hyperkalemia (5.8) continues\par 9/24/2020 treatment held due to cardiac symptoms, troponin checked (27) has been elevated previously, Dr Olson notified of symptoms and patient told to call his office should the angina worsen.  Weakness may be cardiac related, blood pressure also slightly lower than normal; need to follow up with Dr. Pina\par 10/9 /2020  Feels better, decreased mass at the base of the neck.  Stable PO Intake.  Undergoing cardiology evaluation.  Hyperkalemia- repeat testing today. carbo/ taxol weekly day 1 today. \par 10/15/2020- Taxol/ carbo cycle 3 - total dose Taxol x 6\par 11/5/2020 - cycle 4 - carbo/ taxol day 1, 8.  Developing neuropathy- will lower the dose and complete after cycle 4 restaging PETCT  \par 11/12/2020- c/o cough, increased at night, RX PPI last week, he is unclear if picked up.  \par INcreased neuropathy in hands, feet, - hold chemo today\par Hg 8.9 - monitor \par \par 11/19/2020\par PETCT with resolution of malignancy/ adenopathy- hold chemo for now\par - GI / Surgery/ cardio evaluation for cholecystectomy. \par  Neuropathy - on gabapentin \par GERD and reflux - start PPI \par \par 12/23/2020\par s/p cholecystectomy\par Hg 10.5 - better\par Still c/o neuropathy - dropping objects, declined OT\par PETCT - Nov 14, 2020- stable \par RTC 6-8 weeks\par Labs- TSH, CEA, \par \par 2/24/2021\par patient completed treatment\par due for PET restaging in March 2021\par s/p stent placement 2-3 weeks ago [facility]- \par has mild chest discomfort at times- no SOB vitals stable- refuses ER visit- called yesterday by [facility] staff was told normal post procedure- add troponins\par needs follow up with Dr. Olson- attempted to secure apt with him this week- will report to ER with increasing chest pain- tightness and or dyspnea\par \par 4/29/2021\par PETCT April 13, 2021- recurrence of disease- left base of the tongue, LN, FDG - gastric area, peritoneal  mets \par \par Plan \par - mediport\par - egd with GI \par - will plan for Taxotere q 3 weeks\par \par 5/11/2021\par taxotere 75mg/m2 today q 3 weeks tx plan and schema reviewed pt verbalized understanding \par EGD- 5/6/2021-.  Duodenum, polyp, biopsy:\par 	Tubular adenoma involving duodenal mucosa\par 	Duodenal mucosa also shows mild chronic active duodenitis and gastric foveolar\par     metaplasia.\par 	Multiple levels were examined.\par B.  Stomach, antrum, biopsy:\par 	Gastric mucosa, antral type, showing mild chronic inactive gastritis.\par 	No Helicobacter-like organisms identified on Diff Quik stain.\par C.  Stomach, body, biopsy:\par 	Gastric mucosa, oxyntic type, showing parietal cell changes suggestive of PPI effect.\par 	No Helicobacter-like organisms identified on Diff Quik stain.\par D.  Stomach, fundus, biopsy:\par 	Gastric mucosa, oxyntic type, showing parietal cell changes suggestive of PPI effect\par     and few ectatic glands, consistent with fundic gland polyp.\par 	No Helicobacter-like organisms identified on Diff Quik stain.\par E.  Gastroesophageal junction, biopsy:\par 	Scant gastric cardia-type mucosa showing mild chronic inflammation.\par 	Associated esophageal squamous mucosa showing no significant histopathologic\par     changes.	\par No intestinal metaplasia is identified.\par severe pain- IV Dilaudid today 0.5mg - start tramadol prn at home-I stop checked\par  \par case and mgmt discussed with Dr. Velasquez- pt to return in 1 week for tox check cbc and chem \par

## 2021-05-11 NOTE — HISTORY OF PRESENT ILLNESS
[de-identified] : 63 year old male presents today for new diagnosis of head and neck cancer, referred by Dr. Kaur, ENT.  Mr. Camacho was sent to ENT for a left neck mass with associated left tongue base.  He reported a 3 month history of constant, moderate progressive left sided throat pain with radiating otalgia.  Incidentally it was noted on neck US with vascular surgeon to have a left neck mass.  He has a long standing history of cigarette smoking 5- 6 cigarettes per day and uses alcohol in past however has quit x 5 years.  CT scan done 5/16/19 showed a 5.5cm multiloculated solid and cystic mass within the left latera; neck soft tissues.  Findings may reflect primary neoplasm versus conglomerate of left level 3 cystic/necrotic lymphadenopathy.  Additional abnormal left level 2-4 lymphadenopathy.  Nonspecific prominence of the left lingual tonsils with enhancing soft tissue involving the left oropharyngeal lateral wall.  Regional effacement of fat planes laterally involving the left submandibular gland and posteriorly involving the carotid sheath.  Mild asymmetric enlargement of the left submandibular gland.  PET CT is recommend.  He has not had a biopsy.  He has complaints of a sore throat, with left ear pain at times and new onset of headaches.  \par \par Patient was adopted so he does not know his family history [de-identified] : Patient presents today for follow up of head and neck cancer. Patient states that he is experiencing a L ear ache that has been worsening over the 'last few weeks.' He is also c/o difficulty swallowing. He states that he changed his diet to a soft diet to make swallowing easier for him.  He had an EGD Thursday

## 2021-05-11 NOTE — CONSULT LETTER
[Dear  ___] : Dear  [unfilled], [Consult Letter:] : I had the pleasure of evaluating your patient, [unfilled]. [Please see my note below.] : Please see my note below. [Consult Closing:] : Thank you very much for allowing me to participate in the care of this patient.  If you have any questions, please do not hesitate to contact me. [Sincerely,] : Sincerely, [DrEvie  ___] : Dr. CHOWDARY [DrEvie ___] : Dr. CHOWDARY [FreeTextEntry3] : Ana Velasquez MD\par [facility] [place] at [facility]\par

## 2021-05-14 NOTE — HISTORY OF PRESENT ILLNESS
[de-identified] : 63 year old male presents today for new diagnosis of head and neck cancer, referred by Dr. Kaur, ENT.  Mr. Camacho was sent to ENT for a left neck mass with associated left tongue base.  He reported a 3 month history of constant, moderate progressive left sided throat pain with radiating otalgia.  Incidentally it was noted on neck US with vascular surgeon to have a left neck mass.  He has a long standing history of cigarette smoking 5- 6 cigarettes per day and uses alcohol in past however has quit x 5 years.  CT scan done 5/16/19 showed a 5.5cm multiloculated solid and cystic mass within the left latera; neck soft tissues.  Findings may reflect primary neoplasm versus conglomerate of left level 3 cystic/necrotic lymphadenopathy.  Additional abnormal left level 2-4 lymphadenopathy.  Nonspecific prominence of the left lingual tonsils with enhancing soft tissue involving the left oropharyngeal lateral wall.  Regional effacement of fat planes laterally involving the left submandibular gland and posteriorly involving the carotid sheath.  Mild asymmetric enlargement of the left submandibular gland.  PET CT is recommend.  He has not had a biopsy.  He has complaints of a sore throat, with left ear pain at times and new onset of headaches.  \par \par Patient was adopted so he does not know his family history [de-identified] : Patient presents today for follow up of head and neck cancer. Patient states that he is experiencing a L ear ache that has been worsening over the 'last few weeks.' He is also c/o difficulty swallowing. He states that he changed his diet to a soft diet to make swallowing easier for him. He has not had a swallow evaluation at this time.

## 2021-05-14 NOTE — CONSULT LETTER
[Dear  ___] : Dear  [unfilled], [Consult Letter:] : I had the pleasure of evaluating your patient, [unfilled]. [Please see my note below.] : Please see my note below. [Consult Closing:] : Thank you very much for allowing me to participate in the care of this patient.  If you have any questions, please do not hesitate to contact me. [Sincerely,] : Sincerely, [DrvEie  ___] : Dr. CHOWDARY [DrEvie ___] : Dr. CHOWDARY [FreeTextEntry3] : Ana Velasquez MD\par [facility] [place] at [facility]\par

## 2021-05-14 NOTE — PHYSICAL EXAM
[Fully active, able to carry on all pre-disease performance without restriction] : Status 0 - Fully active, able to carry on all pre-disease performance without restriction [Normal] : affect appropriate [de-identified] : left posterior palate - induration together with left tonsillar infiltration, Left cervical adenopathy; improving [de-identified] : BLE edema R>L, improving [de-identified] : left neck base mass significantly decreased in size with red with less erythema, less cystic in appearance, less firm, no pain to palpation, no drainage noted

## 2021-05-14 NOTE — REVIEW OF SYSTEMS
[Lower Ext Edema] : lower extremity edema [Negative] : Constitutional [Recent Change In Weight] : ~T no recent weight change [Dysphagia] : no dysphagia [Diarrhea] : no diarrhea [FreeTextEntry4] : Left ear ache, difficulty swallowing

## 2021-05-14 NOTE — ASSESSMENT
[FreeTextEntry1] : 64 yo male referred by Dr. 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 Patient has palpable left cervical adenopathy. \par \par Patient offered chemo/ RT - agreed for RT only, received 3000 cGy of 7000 cGY, stopped the treatment b/o poor tolerance in October 2019. \par \par  Presented in April 2020 with POD -Left side of the neck - induration at the base of the neck \par Tumor p16 positive\par  Foudation One- PDL-1 1- ( positive), MS - stable, TMB- 4,  PIK3CA amplification ( alpelisib), CCND1 amplification ( abemaciclib) \par  Patient evaluated by Dr. YING Ureña - not a candidate for surgery\par - Refered to Dr. Thapa for evaluation for palliative RT - declined \par - restaging PETCT 3/2020 reviewed- locally advanced disease, with increased pain\par - Pembrolizumab - cycle 2 4/23/2020, cycle 3- 5/14/2020, cycle 4 6/4/2020, cycle 6 /16/2020, 7/16/2020\par - BP decreased - patient on Losartan/ Metoprolol, off meds - with hyperkalemia, hyponatremia \par \par 8/6/2020- progression of the disease, hyperglycemia\par - 8/13/2020 - week 1  carbo/ taxol weekly.  SIde effects explained to patient \par - pain controlled, weight stable, increased left base of the neck adenopathy, with impending ulceration, offered RT again, declined.  Explained the POD with risk of ulceration from rapidly growing cancer. \par - when diabetes under control will rx PIQRAY\par 8/27/2020 blood pressure (100/54) and hgb (8.5) downtrending possibly due to slow bleed, WBC 2.2 with manual ANC 1.1; patient refusing treatment as fearful of counts dropping further.  patient refusing blood transfusion at this time, but agreeable to receiving IV fluids.  BUN and creatinine increasing with hyperkalemia (5.7)\par patient told recurrent mass +/- drainage likely due to progression of disease, he continues to refuse radiation treatments\par 8/31/2020 treatment held, patient sent to ED for hyperkalemia 6.0 (last 5.7 8/27/2020), hgb 8.9, WBC 3.2, CRP increasing now 17.4\par 9/10/2020 taxo/carbo weekly; referral to see Dr. Pina for ongoing hyperkalemia and worsening renal function\par 9/17/2020 taxo/carbo #4 continues, left neck mass responding to treatment, patient needs to see Dr. Pina renal function remains compromised and hyperkalemia (5.8) continues\par 9/24/2020 treatment held due to cardiac symptoms, troponin checked (27) has been elevated previously, Dr Olson notified of symptoms and patient told to call his office should the angina worsen.  Weakness may be cardiac related, blood pressure also slightly lower than normal; need to follow up with Dr. Pina\par 10/9 /2020  Feels better, decreased mass at the base of the neck.  Stable PO Intake.  Undergoing cardiology evaluation.  Hyperkalemia- repeat testing today. carbo/ taxol weekly day 1 today. \par 10/15/2020- Taxol/ carbo cycle 3 - total dose Taxol x 6\par 11/5/2020 - cycle 4 - carbo/ taxol day 1, 8.  Developing neuropathy- will lower the dose and complete after cycle 4 restaging PETCT  \par 11/12/2020- c/o cough, increased at night, RX PPI last week, he is unclear if picked up.  \par INcreased neuropathy in hands, feet, - hold chemo today\par Hg 8.9 - monitor \par \par 11/19/2020\par PETCT with resolution of malignancy/ adenopathy- hold chemo for now\par - GI / Surgery/ cardio evaluation for cholecystectomy. \par  Neuropathy - on gabapentin \par GERD and reflux - start PPI \par \par 12/23/2020\par s/p cholecystectomy\par Hg 10.5 - better\par Still c/o neuropathy - dropping objects, declined OT\par PETCT - Nov 14, 2020- stable \par RTC 6-8 weeks\par Labs- TSH, CEA, \par \par 2/24/2021\par patient completed treatment\par due for PET restaging in March 2021\par s/p stent placement 2-3 weeks ago [facility]- \par has mild chest discomfort at times- no SOB vitals stable- refuses ER visit- called yesterday by [facility] staff was told normal post procedure- add troponins\par needs follow up with Dr. Olson- attempted to secure apt with him this week- will report to ER with increasing chest pain- tightness and or dyspnea\par \par 4/29/2021\par PETCT April 13, 2021- recurrence of disease- left base of the tongue, LN, FDG - gastric area, peritoneal  mets \par \par Plan \par - mediport\par - egd with GI \par - will plan for Taxotere q 3 weeks\par \par \par

## 2021-05-18 PROBLEM — I50.22 CHRONIC SYSTOLIC CONGESTIVE HEART FAILURE: Status: ACTIVE | Noted: 2020-08-31

## 2021-05-18 NOTE — PHYSICAL EXAM
[Fully active, able to carry on all pre-disease performance without restriction] : Status 0 - Fully active, able to carry on all pre-disease performance without restriction [Normal] : affect appropriate [de-identified] : left posterior palate - induration together with left tonsillar infiltration, Left cervical adenopathy; improving [de-identified] : BLE edema R>L, improving [de-identified] : left neck base mass significantly decreased in size with red with less erythema, less cystic in appearance, less firm, no pain to palpation, no drainage noted

## 2021-05-18 NOTE — CONSULT LETTER
[Dear  ___] : Dear  [unfilled], [Consult Letter:] : I had the pleasure of evaluating your patient, [unfilled]. [Please see my note below.] : Please see my note below. [Consult Closing:] : Thank you very much for allowing me to participate in the care of this patient.  If you have any questions, please do not hesitate to contact me. [Sincerely,] : Sincerely, [DrEvie  ___] : Dr. CHOWDARY [DrEvie ___] : Dr. CHOWDARY [FreeTextEntry3] : Ana Velasquez MD\par [facility] [place] at [facility]\par

## 2021-05-18 NOTE — REVIEW OF SYSTEMS
[Fatigue] : fatigue [Recent Change In Weight] : ~T recent weight change [Lower Ext Edema] : lower extremity edema [Negative] : Heme/Lymph [Dysphagia] : no dysphagia [Diarrhea] : no diarrhea [FreeTextEntry4] : Left ear ache, difficulty swallowing

## 2021-05-18 NOTE — HISTORY OF PRESENT ILLNESS
[de-identified] : 63 year old male presents today for new diagnosis of head and neck cancer, referred by Dr. Kaur, ENT.  Mr. Camacho was sent to ENT for a left neck mass with associated left tongue base.  He reported a 3 month history of constant, moderate progressive left sided throat pain with radiating otalgia.  Incidentally it was noted on neck US with vascular surgeon to have a left neck mass.  He has a long standing history of cigarette smoking 5- 6 cigarettes per day and uses alcohol in past however has quit x 5 years.  CT scan done 5/16/19 showed a 5.5cm multiloculated solid and cystic mass within the left latera; neck soft tissues.  Findings may reflect primary neoplasm versus conglomerate of left level 3 cystic/necrotic lymphadenopathy.  Additional abnormal left level 2-4 lymphadenopathy.  Nonspecific prominence of the left lingual tonsils with enhancing soft tissue involving the left oropharyngeal lateral wall.  Regional effacement of fat planes laterally involving the left submandibular gland and posteriorly involving the carotid sheath.  Mild asymmetric enlargement of the left submandibular gland.  PET CT is recommend.  He has not had a biopsy.  He has complaints of a sore throat, with left ear pain at times and new onset of headaches.  \par \par Patient was adopted so he does not know his family history [de-identified] : Patient presents today for follow up of head and neck cancer. Patient s/p taxoetere last week

## 2021-05-18 NOTE — ASSESSMENT
[FreeTextEntry1] : 64 yo male referred by Dr. 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 Patient has palpable left cervical adenopathy. \par \par Patient offered chemo/ RT - agreed for RT only, received 3000 cGy of 7000 cGY, stopped the treatment b/o poor tolerance in October 2019. \par \par  Presented in April 2020 with POD -Left side of the neck - induration at the base of the neck \par Tumor p16 positive\par  Foudation One- PDL-1 1- ( positive), MS - stable, TMB- 4,  PIK3CA amplification ( alpelisib), CCND1 amplification ( abemaciclib) \par  Patient evaluated by Dr. YING Ureña - not a candidate for surgery\par - Refered to Dr. Thapa for evaluation for palliative RT - declined \par - restaging PETCT 3/2020 reviewed- locally advanced disease, with increased pain\par - Pembrolizumab - cycle 2 4/23/2020, cycle 3- 5/14/2020, cycle 4 6/4/2020, cycle 6 /16/2020, 7/16/2020\par - BP decreased - patient on Losartan/ Metoprolol, off meds - with hyperkalemia, hyponatremia \par \par 8/6/2020- progression of the disease, hyperglycemia\par - 8/13/2020 - week 1  carbo/ taxol weekly.  SIde effects explained to patient \par - pain controlled, weight stable, increased left base of the neck adenopathy, with impending ulceration, offered RT again, declined.  Explained the POD with risk of ulceration from rapidly growing cancer. \par - when diabetes under control will rx PIQRAY\par 8/27/2020 blood pressure (100/54) and hgb (8.5) downtrending possibly due to slow bleed, WBC 2.2 with manual ANC 1.1; patient refusing treatment as fearful of counts dropping further.  patient refusing blood transfusion at this time, but agreeable to receiving IV fluids.  BUN and creatinine increasing with hyperkalemia (5.7)\par patient told recurrent mass +/- drainage likely due to progression of disease, he continues to refuse radiation treatments\par 8/31/2020 treatment held, patient sent to ED for hyperkalemia 6.0 (last 5.7 8/27/2020), hgb 8.9, WBC 3.2, CRP increasing now 17.4\par 9/10/2020 taxo/carbo weekly; referral to see Dr. Pina for ongoing hyperkalemia and worsening renal function\par 9/17/2020 taxo/carbo #4 continues, left neck mass responding to treatment, patient needs to see Dr. Pina renal function remains compromised and hyperkalemia (5.8) continues\par 9/24/2020 treatment held due to cardiac symptoms, troponin checked (27) has been elevated previously, Dr Olson notified of symptoms and patient told to call his office should the angina worsen.  Weakness may be cardiac related, blood pressure also slightly lower than normal; need to follow up with Dr. Pina\par 10/9 /2020  Feels better, decreased mass at the base of the neck.  Stable PO Intake.  Undergoing cardiology evaluation.  Hyperkalemia- repeat testing today. carbo/ taxol weekly day 1 today. \par 10/15/2020- Taxol/ carbo cycle 3 - total dose Taxol x 6\par 11/5/2020 - cycle 4 - carbo/ taxol day 1, 8.  Developing neuropathy- will lower the dose and complete after cycle 4 restaging PETCT  \par 11/12/2020- c/o cough, increased at night, RX PPI last week, he is unclear if picked up.  \par INcreased neuropathy in hands, feet, - hold chemo today\par Hg 8.9 - monitor \par \par 11/19/2020\par PETCT with resolution of malignancy/ adenopathy- hold chemo for now\par - GI / Surgery/ cardio evaluation for cholecystectomy. \par  Neuropathy - on gabapentin \par GERD and reflux - start PPI \par \par 12/23/2020\par s/p cholecystectomy\par Hg 10.5 - better\par Still c/o neuropathy - dropping objects, declined OT\par PETCT - Nov 14, 2020- stable \par RTC 6-8 weeks\par Labs- TSH, CEA, \par \par 2/24/2021\par patient completed treatment\par due for PET restaging in March 2021\par s/p stent placement 2-3 weeks ago [facility]- \par has mild chest discomfort at times- no SOB vitals stable- refuses ER visit- called yesterday by [facility] staff was told normal post procedure- add troponins\par needs follow up with Dr. Olson- attempted to secure apt with him this week- will report to ER with increasing chest pain- tightness and or dyspnea\par \par 4/29/2021\par PETCT April 13, 2021- recurrence of disease- left base of the tongue, LN, FDG - gastric area, peritoneal  mets \par \par Plan \par - mediport\par - egd with GI \par - will plan for Taxotere q 3 weeks\par \par 5/11/2021\par taxotere 75mg/m2 today q 3 weeks tx plan and schema reviewed pt verbalized understanding \par EGD- 5/6/2021-.  Duodenum, polyp, biopsy:\par 	Tubular adenoma involving duodenal mucosa\par 	Duodenal mucosa also shows mild chronic active duodenitis and gastric foveolar\par     metaplasia.\par 	Multiple levels were examined.\par B.  Stomach, antrum, biopsy:\par 	Gastric mucosa, antral type, showing mild chronic inactive gastritis.\par 	No Helicobacter-like organisms identified on Diff Quik stain.\par C.  Stomach, body, biopsy:\par 	Gastric mucosa, oxyntic type, showing parietal cell changes suggestive of PPI effect.\par 	No Helicobacter-like organisms identified on Diff Quik stain.\par D.  Stomach, fundus, biopsy:\par 	Gastric mucosa, oxyntic type, showing parietal cell changes suggestive of PPI effect\par     and few ectatic glands, consistent with fundic gland polyp.\par 	No Helicobacter-like organisms identified on Diff Quik stain.\par E.  Gastroesophageal junction, biopsy:\par 	Scant gastric cardia-type mucosa showing mild chronic inflammation.\par 	Associated esophageal squamous mucosa showing no significant histopathologic\par     changes.	\par No intestinal metaplasia is identified.\par severe pain- IV Dilaudid today 0.5mg - start tramadol prn at home-I stop checked\par  \par 5/18/2021\par -tox check -anc 0.32, wbc 0.97- zarxio x 3 days \par -was in ER for severe head/ face and right ear pain- no acute path no brain mets. hemosiderin deposition from chronic microhemorrhage (which can be seen in the setting of amyloid angiopathy). \par - Given rx for Vicodin with some relief- re prescribe today- I stop checked \par -encouraged bowel regimen

## 2021-05-25 PROBLEM — I10 HYPERTENSION: Status: ACTIVE | Noted: 2018-11-28

## 2021-05-25 PROBLEM — E78.5 HYPERLIPIDEMIA LDL GOAL <100: Status: ACTIVE | Noted: 2020-08-17

## 2021-05-25 NOTE — ASSESSMENT
[Long Term Vascular Complications] : long term vascular complications of diabetes [Hypoglycemia Management] : hypoglycemia management [Self Monitoring of Blood Glucose] : self monitoring of blood glucose [Injection Technique, Storage, Sharps Disposal] : injection technique, storage, and sharps disposal [Retinopathy Screening] : Patient was referred to ophthalmology for retinopathy screening [FreeTextEntry1] : Trulicity by our RN

## 2021-05-25 NOTE — HISTORY OF PRESENT ILLNESS
[FreeTextEntry1] : Mr. JEOY LONG is a 65 year old male with newly diagnosed tongue cancer in May 2019 T2N2 HPV + sent by Dr Velasquez for out of control diabetes , seen me only once \par has binges eating CHO then eats better\par Patient offered chemo/ RT - proceeded with RT only, received 3000 cGy of 7000 cGY, stopped the treatment b/o poor tolerance in October 2019. \par has been eating worse lately \par may start on Alpelisib\par type 2 DM for  at least 6  years complicated by     neuropathy       \par on Tresiba  26 units qhs \par Humalog  6units actid \par took it last night \par \par Last HgA1c 8/2020 10.6\par \par Checks BS none \par \par Microvascular complications:\par Nephropathy denies last Cr at goal WNL \par Neuropathy  symptoms, yes big toe amputation 2014\par microfilament exam \par Retinopathy        last eye exam over a year Dr Italia Chavez [phone] [facility]\par \par Macrovascular complications:\par \par HTN at goal \par CAD/CVA  yes MI sees Dr Wilmer Vaca [facility]\par Lipids  not available   on  Atorvastatin  with  LFT's normal 8/2020 used to be a heavy drinker last 6yrs ago per pt \par \par

## 2021-06-01 PROBLEM — N17.9 ACUTE KIDNEY INJURY: Status: ACTIVE | Noted: 2020-08-27

## 2021-06-01 PROBLEM — G62.9 NEUROPATHY: Status: ACTIVE | Noted: 2020-01-01

## 2021-06-01 PROBLEM — R05 COUGH: Status: ACTIVE | Noted: 2020-01-01

## 2021-06-01 PROBLEM — E11.9 DIABETES: Status: ACTIVE | Noted: 2020-06-25

## 2021-06-01 NOTE — PHYSICAL EXAM
[Fully active, able to carry on all pre-disease performance without restriction] : Status 0 - Fully active, able to carry on all pre-disease performance without restriction [Normal] : affect appropriate [de-identified] : left posterior palate - induration together with left tonsillar infiltration, Left cervical adenopathy; improving [de-identified] : BLE edema R>L, improving [de-identified] : left neck base mass significantly decreased in size with red with less erythema, less cystic in appearance, less firm, no pain to palpation, no drainage noted

## 2021-06-01 NOTE — ASSESSMENT
[FreeTextEntry1] : 64 yo male referred by Dr. 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 Patient has palpable left cervical adenopathy. \par \par Patient offered chemo/ RT - agreed for RT only, received 3000 cGy of 7000 cGY, stopped the treatment b/o poor tolerance in October 2019. \par \par  Presented in April 2020 with POD -Left side of the neck - induration at the base of the neck \par Tumor p16 positive\par  Foudation One- PDL-1 1- ( positive), MS - stable, TMB- 4,  PIK3CA amplification ( alpelisib), CCND1 amplification ( abemaciclib) \par  Patient evaluated by Dr. YING Ureña - not a candidate for surgery\par - Refered to Dr. Thapa for evaluation for palliative RT - declined \par - restaging PETCT 3/2020 reviewed- locally advanced disease, with increased pain\par - Pembrolizumab - cycle 2 4/23/2020, cycle 3- 5/14/2020, cycle 4 6/4/2020, cycle 6 /16/2020, 7/16/2020\par - BP decreased - patient on Losartan/ Metoprolol, off meds - with hyperkalemia, hyponatremia \par \par 8/6/2020- progression of the disease, hyperglycemia\par - 8/13/2020 - week 1  carbo/ taxol weekly.  SIde effects explained to patient \par - pain controlled, weight stable, increased left base of the neck adenopathy, with impending ulceration, offered RT again, declined.  Explained the POD with risk of ulceration from rapidly growing cancer. \par - when diabetes under control will rx PIQRAY\par 8/27/2020 blood pressure (100/54) and hgb (8.5) downtrending possibly due to slow bleed, WBC 2.2 with manual ANC 1.1; patient refusing treatment as fearful of counts dropping further.  patient refusing blood transfusion at this time, but agreeable to receiving IV fluids.  BUN and creatinine increasing with hyperkalemia (5.7)\par patient told recurrent mass +/- drainage likely due to progression of disease, he continues to refuse radiation treatments\par 8/31/2020 treatment held, patient sent to ED for hyperkalemia 6.0 (last 5.7 8/27/2020), hgb 8.9, WBC 3.2, CRP increasing now 17.4\par 9/10/2020 taxo/carbo weekly; referral to see Dr. Pina for ongoing hyperkalemia and worsening renal function\par 9/17/2020 taxo/carbo #4 continues, left neck mass responding to treatment, patient needs to see Dr. Pina renal function remains compromised and hyperkalemia (5.8) continues\par 9/24/2020 treatment held due to cardiac symptoms, troponin checked (27) has been elevated previously, Dr Olson notified of symptoms and patient told to call his office should the angina worsen.  Weakness may be cardiac related, blood pressure also slightly lower than normal; need to follow up with Dr. Pina\par 10/9 /2020  Feels better, decreased mass at the base of the neck.  Stable PO Intake.  Undergoing cardiology evaluation.  Hyperkalemia- repeat testing today. carbo/ taxol weekly day 1 today. \par 10/15/2020- Taxol/ carbo cycle 3 - total dose Taxol x 6\par 11/5/2020 - cycle 4 - carbo/ taxol day 1, 8.  Developing neuropathy- will lower the dose and complete after cycle 4 restaging PETCT  \par 11/12/2020- c/o cough, increased at night, RX PPI last week, he is unclear if picked up.  \par INcreased neuropathy in hands, feet, - hold chemo today\par Hg 8.9 - monitor \par \par 11/19/2020\par PETCT with resolution of malignancy/ adenopathy- hold chemo for now\par - GI / Surgery/ cardio evaluation for cholecystectomy. \par  Neuropathy - on gabapentin \par GERD and reflux - start PPI \par \par 12/23/2020\par s/p cholecystectomy\par Hg 10.5 - better\par Still c/o neuropathy - dropping objects, declined OT\par PETCT - Nov 14, 2020- stable \par RTC 6-8 weeks\par Labs- TSH, CEA, \par \par 2/24/2021\par patient completed treatment\par due for PET restaging in March 2021\par s/p stent placement 2-3 weeks ago [facility]- \par has mild chest discomfort at times- no SOB vitals stable- refuses ER visit- called yesterday by [facility] staff was told normal post procedure- add troponins\par needs follow up with Dr. Olson- attempted to secure apt with him this week- will report to ER with increasing chest pain- tightness and or dyspnea\par \par 4/29/2021\par PETCT April 13, 2021- recurrence of disease- left base of the tongue, LN, FDG - gastric area, peritoneal  mets \par \par Plan \par - mediport\par - egd with GI \par - will plan for Taxotere q 3 weeks\par \par 5/11/2021\par taxotere 75mg/m2 today q 3 weeks tx plan and schema reviewed pt verbalized understanding \par EGD- 5/6/2021-.  Duodenum, polyp, biopsy:\par 	Tubular adenoma involving duodenal mucosa\par 	Duodenal mucosa also shows mild chronic active duodenitis and gastric foveolar\par     metaplasia.\par 	Multiple levels were examined.\par B.  Stomach, antrum, biopsy:\par 	Gastric mucosa, antral type, showing mild chronic inactive gastritis.\par 	No Helicobacter-like organisms identified on Diff Quik stain.\par C.  Stomach, body, biopsy:\par 	Gastric mucosa, oxyntic type, showing parietal cell changes suggestive of PPI effect.\par 	No Helicobacter-like organisms identified on Diff Quik stain.\par D.  Stomach, fundus, biopsy:\par 	Gastric mucosa, oxyntic type, showing parietal cell changes suggestive of PPI effect\par     and few ectatic glands, consistent with fundic gland polyp.\par 	No Helicobacter-like organisms identified on Diff Quik stain.\par E.  Gastroesophageal junction, biopsy:\par 	Scant gastric cardia-type mucosa showing mild chronic inflammation.\par 	Associated esophageal squamous mucosa showing no significant histopathologic\par     changes.	\par No intestinal metaplasia is identified.\par severe pain- IV Dilaudid today 0.5mg - start tramadol prn at home-I stop checked\par  \par 5/18/2021\par -tox check -anc 0.32, wbc 0.97- zarxio x 3 days \par -was in ER for severe head/ face and right ear pain- no acute path no brain mets. hemosiderin deposition from chronic microhemorrhage (which can be seen in the setting of amyloid angiopathy). \par - Given rx for Vicodin with some relief- re prescribe today- I stop checked \par -encouraged bowel regimen \par \par 6/1/2021\par cycle 2 today- pain improving with vicoden\par some dysaphia- tolerating fluids and food\par constipation- forgets to use MiraLAX\par last cycle complicated by neutropenia- onpro today\par cbc stable \par \par \par \par case and mgmt discussed with Dr. Velasquez- pt to return in 3 weeks for treatment or sooner if needed- cbc chem cea

## 2021-06-01 NOTE — HISTORY OF PRESENT ILLNESS
[de-identified] : 63 year old male presents today for new diagnosis of head and neck cancer, referred by Dr. Kaur, ENT.  Mr. Camacho was sent to ENT for a left neck mass with associated left tongue base.  He reported a 3 month history of constant, moderate progressive left sided throat pain with radiating otalgia.  Incidentally it was noted on neck US with vascular surgeon to have a left neck mass.  He has a long standing history of cigarette smoking 5- 6 cigarettes per day and uses alcohol in past however has quit x 5 years.  CT scan done 5/16/19 showed a 5.5cm multiloculated solid and cystic mass within the left latera; neck soft tissues.  Findings may reflect primary neoplasm versus conglomerate of left level 3 cystic/necrotic lymphadenopathy.  Additional abnormal left level 2-4 lymphadenopathy.  Nonspecific prominence of the left lingual tonsils with enhancing soft tissue involving the left oropharyngeal lateral wall.  Regional effacement of fat planes laterally involving the left submandibular gland and posteriorly involving the carotid sheath.  Mild asymmetric enlargement of the left submandibular gland.  PET CT is recommend.  He has not had a biopsy.  He has complaints of a sore throat, with left ear pain at times and new onset of headaches.  \par \par Patient was adopted so he does not know his family history [de-identified] : Patient presents today for follow up of head and neck cancer. Patient s/p taxoetere last week

## 2021-06-01 NOTE — CONSULT LETTER
[Dear  ___] : Dear  [unfilled], [Consult Letter:] : I had the pleasure of evaluating your patient, [unfilled]. [Please see my note below.] : Please see my note below. [Consult Closing:] : Thank you very much for allowing me to participate in the care of this patient.  If you have any questions, please do not hesitate to contact me. [Sincerely,] : Sincerely, [DrEvie  ___] : Dr. CHOWDARY [DrEvie ___] : Dr. CHOWDARY [FreeTextEntry3] : Ana Velasquez MD\par [facility] [place] [facility]\par

## 2021-06-16 PROBLEM — E11.22 CKD STAGE 3 DUE TO TYPE 2 DIABETES MELLITUS: Status: ACTIVE | Noted: 2020-10-09

## 2021-06-22 PROBLEM — D64.9 ANEMIA: Status: ACTIVE | Noted: 2020-08-27

## 2021-06-22 PROBLEM — R22.1 MASS OF LEFT SIDE OF NECK: Status: ACTIVE | Noted: 2019-05-20

## 2021-06-22 PROBLEM — T14.8XXA BLOOD BLISTER: Status: ACTIVE | Noted: 2021-01-01

## 2021-06-22 NOTE — PHYSICAL EXAM
[Fully active, able to carry on all pre-disease performance without restriction] : Status 0 - Fully active, able to carry on all pre-disease performance without restriction [Normal] : affect appropriate [de-identified] : left posterior palate - induration together with left tonsillar infiltration, Left cervical adenopathy; improving [de-identified] : BLE edema R>L, improving [de-identified] : left neck base mass significantly decreased in size with red with less erythema, less cystic in appearance, less firm, no pain to palpation, no drainage noted- see noted regarding foot wounds

## 2021-06-22 NOTE — HISTORY OF PRESENT ILLNESS
[FreeTextEntry1] : The etiology is unknown, the initial blister to the left heel she went to the emergency department so treatment of his discharged to presented to emergency department today with worsening of the left heel ulcer and separate identifiable problems in the blood blister at the tip of the third toe and abscess to the medial side of the left foot and a blister on the medial side of the left great toe

## 2021-06-22 NOTE — CONSULT LETTER
[Dear  ___] : Dear  [unfilled], [Consult Letter:] : I had the pleasure of evaluating your patient, [unfilled]. [Please see my note below.] : Please see my note below. [Consult Closing:] : Thank you very much for allowing me to participate in the care of this patient.  If you have any questions, please do not hesitate to contact me. [Sincerely,] : Sincerely, [DrEvie  ___] : Dr. CHOWDARY [DrEvie ___] : Dr. CHOWDARY [FreeTextEntry3] : Ana Velasquez MD\par [facility] [place] at [facility]\par

## 2021-06-22 NOTE — PHYSICAL EXAM
[General Appearance - Alert] : alert [General Appearance - In No Acute Distress] : in no acute distress [FreeTextEntry1] : 3rd toe-blood blister [FreeTextEntry2] : 1  [FreeTextEntry3] : 0.5 [FreeTextEntry4] : 0.1 [de-identified] : shoe? [de-identified] : I and D of blister [de-identified] : silvadene daily [FreeTextEntry7] : heel [FreeTextEntry8] : 2 [FreeTextEntry9] : 2 [de-identified] : 0.1 [FreeTextEntry6] : I and D od large blister [de-identified] : silvadene [de-identified] : medial side of 1st met-abscess [de-identified] : 2 [de-identified] : 2 [de-identified] : 0.1 [de-identified] : I and D [de-identified] : silvadene [de-identified] : Wound # 4\par Medial side of left GT\par Blister\par 2x1.5x0.1\par Trauma\par I and D-silvadene [TWNoteComboBox1] : Right [TWNoteComboBox4] : Small [TWNoteComboBox6] : Traumatic [de-identified] : 100% [de-identified] : Normal [TWNoteComboBox7] : Blanche [de-identified] : Other [TWNoteComboBox9] : Left [de-identified] : 2 [de-identified] : Moderate [de-identified] : Traumatic [de-identified] : 100% [TWNoteComboBox8] : Other [de-identified] : Left [de-identified] : 2 [de-identified] : Large [de-identified] : Traumatic [de-identified] : 100% [de-identified] : Blanche [de-identified] : Cultures obtained

## 2021-06-22 NOTE — ASSESSMENT
[FreeTextEntry1] : 66 yo male referred by Dr. 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 Patient has palpable left cervical adenopathy. \par \par Patient offered chemo/ RT - agreed for RT only, received 3000 cGy of 7000 cGY, stopped the treatment b/o poor tolerance in October 2019. \par \par  Presented in April 2020 with POD -Left side of the neck - induration at the base of the neck \par Tumor p16 positive\par  Foudation One- PDL-1 1- ( positive), MS - stable, TMB- 4,  PIK3CA amplification ( alpelisib), CCND1 amplification ( abemaciclib) \par  Patient evaluated by Dr. YING Ureña - not a candidate for surgery\par - Refered to Dr. Thapa for evaluation for palliative RT - declined \par - restaging PETCT 3/2020 reviewed- locally advanced disease, with increased pain\par - Pembrolizumab - cycle 2 4/23/2020, cycle 3- 5/14/2020, cycle 4 6/4/2020, cycle 6 /16/2020, 7/16/2020\par - BP decreased - patient on Losartan/ Metoprolol, off meds - with hyperkalemia, hyponatremia \par \par 8/6/2020- progression of the disease, hyperglycemia\par - 8/13/2020 - week 1  carbo/ taxol weekly.  SIde effects explained to patient \par - pain controlled, weight stable, increased left base of the neck adenopathy, with impending ulceration, offered RT again, declined.  Explained the POD with risk of ulceration from rapidly growing cancer. \par - when diabetes under control will rx PIQRAY\par 8/27/2020 blood pressure (100/54) and hgb (8.5) downtrending possibly due to slow bleed, WBC 2.2 with manual ANC 1.1; patient refusing treatment as fearful of counts dropping further.  patient refusing blood transfusion at this time, but agreeable to receiving IV fluids.  BUN and creatinine increasing with hyperkalemia (5.7)\par patient told recurrent mass +/- drainage likely due to progression of disease, he continues to refuse radiation treatments\par 8/31/2020 treatment held, patient sent to ED for hyperkalemia 6.0 (last 5.7 8/27/2020), hgb 8.9, WBC 3.2, CRP increasing now 17.4\par 9/10/2020 taxo/carbo weekly; referral to see Dr. Pina for ongoing hyperkalemia and worsening renal function\par 9/17/2020 taxo/carbo #4 continues, left neck mass responding to treatment, patient needs to see Dr. Pina renal function remains compromised and hyperkalemia (5.8) continues\par 9/24/2020 treatment held due to cardiac symptoms, troponin checked (27) has been elevated previously, Dr Olson notified of symptoms and patient told to call his office should the angina worsen.  Weakness may be cardiac related, blood pressure also slightly lower than normal; need to follow up with Dr. Pina\par 10/9 /2020  Feels better, decreased mass at the base of the neck.  Stable PO Intake.  Undergoing cardiology evaluation.  Hyperkalemia- repeat testing today. carbo/ taxol weekly day 1 today. \par 10/15/2020- Taxol/ carbo cycle 3 - total dose Taxol x 6\par 11/5/2020 - cycle 4 - carbo/ taxol day 1, 8.  Developing neuropathy- will lower the dose and complete after cycle 4 restaging PETCT  \par 11/12/2020- c/o cough, increased at night, RX PPI last week, he is unclear if picked up.  \par INcreased neuropathy in hands, feet, - hold chemo today\par Hg 8.9 - monitor \par \par 11/19/2020\par PETCT with resolution of malignancy/ adenopathy- hold chemo for now\par - GI / Surgery/ cardio evaluation for cholecystectomy. \par  Neuropathy - on gabapentin \par GERD and reflux - start PPI \par \par 12/23/2020\par s/p cholecystectomy\par Hg 10.5 - better\par Still c/o neuropathy - dropping objects, declined OT\par PETCT - Nov 14, 2020- stable \par RTC 6-8 weeks\par Labs- TSH, CEA, \par \par 2/24/2021\par patient completed treatment\par due for PET restaging in March 2021\par s/p stent placement 2-3 weeks ago [facility]- \par has mild chest discomfort at times- no SOB vitals stable- refuses ER visit- called yesterday by [facility] staff was told normal post procedure- add troponins\par needs follow up with Dr. Olson- attempted to secure apt with him this week- will report to ER with increasing chest pain- tightness and or dyspnea\par \par 4/29/2021\par PETCT April 13, 2021- recurrence of disease- left base of the tongue, LN, FDG - gastric area, peritoneal  mets \par \par Plan \par - mediport\par - egd with GI \par - will plan for Taxotere q 3 weeks\par \par 5/11/2021\par taxotere 75mg/m2 today q 3 weeks tx plan and schema reviewed pt verbalized understanding \par EGD- 5/6/2021-.  Duodenum, polyp, biopsy:\par 	Tubular adenoma involving duodenal mucosa\par 	Duodenal mucosa also shows mild chronic active duodenitis and gastric foveolar\par     metaplasia.\par 	Multiple levels were examined.\par B.  Stomach, antrum, biopsy:\par 	Gastric mucosa, antral type, showing mild chronic inactive gastritis.\par 	No Helicobacter-like organisms identified on Diff Quik stain.\par C.  Stomach, body, biopsy:\par 	Gastric mucosa, oxyntic type, showing parietal cell changes suggestive of PPI effect.\par 	No Helicobacter-like organisms identified on Diff Quik stain.\par D.  Stomach, fundus, biopsy:\par 	Gastric mucosa, oxyntic type, showing parietal cell changes suggestive of PPI effect\par     and few ectatic glands, consistent with fundic gland polyp.\par 	No Helicobacter-like organisms identified on Diff Quik stain.\par E.  Gastroesophageal junction, biopsy:\par 	Scant gastric cardia-type mucosa showing mild chronic inflammation.\par 	Associated esophageal squamous mucosa showing no significant histopathologic\par     changes.	\par No intestinal metaplasia is identified.\par severe pain- IV Dilaudid today 0.5mg - start tramadol prn at home-I stop checked\par  \par 5/18/2021\par -tox check -anc 0.32, wbc 0.97- zarxio x 3 days \par -was in ER for severe head/ face and right ear pain- no acute path no brain mets. hemosiderin deposition from chronic microhemorrhage (which can be seen in the setting of amyloid angiopathy). \par - Given rx for Vicodin with some relief- re prescribe today- I stop checked \par -encouraged bowel regimen \par \par 6/1/2021\par cycle 2 today- pain improving with vicoden\par some dysaphia- tolerating fluids and food\par constipation- forgets to use MiraLAX\par last cycle complicated by neutropenia- onpro today\par cbc stable \par \par \par 6/22/2021\par Patient was sent from ER to Dr. Carrington for multiple foot blisters/ wounds- s/p  I and D cleaned and wrapped- cultures obtained -Wound #1: Right 3rd toe-blood blister Size: 1 x 0.5 x 0.1 -Wound #2: Left heel Size: 2 x 2 x 0.1 large blister-Wound #3:Left medial side of 1st met-abscess Size: 2 x 2 x 0.1  Wound # 4Medial side of left GT Blister 2x1.5x0.1\par hold treatment today\par still pain and some dysphagia pt to have consult with Dr. Thapa for palliative RT to neck \par cont to follow up with Dr. Carrington- has apt Thursday- dressing reapplied to left foot \par \par \par case and mgmt discussed with Dr. Velasquez- pt to return in 3 weeks for treatment or sooner if needed- cbc chem cea

## 2021-06-22 NOTE — HISTORY OF PRESENT ILLNESS
[de-identified] : 63 year old male presents today for new diagnosis of head and neck cancer, referred by Dr. Kaur, ENT.  Mr. Camacho was sent to ENT for a left neck mass with associated left tongue base.  He reported a 3 month history of constant, moderate progressive left sided throat pain with radiating otalgia.  Incidentally it was noted on neck US with vascular surgeon to have a left neck mass.  He has a long standing history of cigarette smoking 5- 6 cigarettes per day and uses alcohol in past however has quit x 5 years.  CT scan done 5/16/19 showed a 5.5cm multiloculated solid and cystic mass within the left latera; neck soft tissues.  Findings may reflect primary neoplasm versus conglomerate of left level 3 cystic/necrotic lymphadenopathy.  Additional abnormal left level 2-4 lymphadenopathy.  Nonspecific prominence of the left lingual tonsils with enhancing soft tissue involving the left oropharyngeal lateral wall.  Regional effacement of fat planes laterally involving the left submandibular gland and posteriorly involving the carotid sheath.  Mild asymmetric enlargement of the left submandibular gland.  PET CT is recommend.  He has not had a biopsy.  He has complaints of a sore throat, with left ear pain at times and new onset of headaches.  \par \par Patient was adopted so he does not know his family history [de-identified] : Patient presents today for follow up of head and neck cancer. Patient s/p taxoetere last week

## 2021-06-22 NOTE — PROCEDURE
[FreeTextEntry1] : I saw the patient on an the emergency basis as the patient was  in the emergency room and notified  that my patient is multiple blisters and abscess. I advised him to wrap him in a sterile dressing was seen in the office. Upon arrival in the office with a blood blister to the tip of the right third toe a large partially deroofed left heel blister and access to the medial side of the first metatarsal on the left foot and a large blister to the medial side of the left great toe. The patient under topical anesthesia underwent immediate incision and drainage of the blood blister, remainder of the left heel, a sterile incision and drainage with cultures obtained to the medial side of the left foot as well as an incision and drainage under topical to left great toe. The abscess was drained and sent for Gram stain culture and sensitivity. The dimensions of the wounds can be seen above. A dry sterile bandage with Silvadene and discharge instructions were written and dispensed to the patient. There is no cellulitis with vital signs are stable and there were no fever chills nausea or vomiting discuss with emergency department physician it was determined that any antibiotics since there is no sign of systemic infection and treat locally.\par f/u 48 hours

## 2021-06-24 PROBLEM — S90.822A: Status: ACTIVE | Noted: 2021-01-01

## 2021-06-24 PROBLEM — E11.8 DIABETES MELLITUS TYPE 2, UNCONTROLLED, WITH COMPLICATIONS: Status: ACTIVE | Noted: 2020-08-17

## 2021-06-24 NOTE — HISTORY OF PRESENT ILLNESS
[FreeTextEntry1] : Mr. 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 \par has been eating worse lately \par may start on Alpelisib\par type 2 DM for  at least 6  years complicated by     neuropathy       \par on Tresiba  26 units qhs \par Humalog  6units actid \par took it last night \par \par Last HgA1c 8/2020 10.6\par \par Checks BS none \par \par Microvascular complications:\par Nephropathy denies last Cr at goal WNL \par Neuropathy  symptoms, yes big toe amputation 2014\par microfilament exam \par Retinopathy        last eye exam over a year Dr Italia Chavez [phone] [facility]\par \par Macrovascular complications:\par \par HTN at goal \par CAD/CVA  yes MI sees Dr Wilmer Vaca [facility]\par Lipids  not available   on  Atorvastatin  with  LFT's normal 8/2020 used to be a heavy drinker last 6yrs ago per pt \par \par

## 2021-06-24 NOTE — PROCEDURE
[FreeTextEntry1] : A lengthy discussion with the patient regarding the current medication being prescribed, the dosage, frequency, and the need to be consistent with taking the medication at or about the same time every day. I also spent time reviewing the risks alternatives and benefits to the medication as well as the potential side effects. The decision was made to proceed with the medication\par Keflex 2gm/day\par C and S noted\par I have reviewed the care orders with the patient they understand and they also understands the long-term consequences of not following my wound care orders as well  precautions I have recommended  and shoe gear advice I have mentioned.\par VNS notified

## 2021-06-24 NOTE — PHYSICAL EXAM
[General Appearance - Alert] : alert [General Appearance - In No Acute Distress] : in no acute distress [FreeTextEntry1] : 3rd toe-blood blister [FreeTextEntry2] : 0.5 [FreeTextEntry3] : 0.5 [FreeTextEntry4] : 0.1 [de-identified] : shoe? [de-identified] : I and D of blister [FreeTextEntry7] : heel [de-identified] : BETADINE DAILY daily [FreeTextEntry8] : 2 [de-identified] : 0.2 [FreeTextEntry9] : 2 [de-identified] : silvadene [de-identified] : medial side of 1st met-abscess [de-identified] : 2 [de-identified] : 2 [de-identified] : 0.1 [de-identified] : I and D [de-identified] : GETTING REDDER, WILL START ON ORAL aBX [de-identified] : silvadene [de-identified] : Wound # 4\par Medial side of left GT\par Blister\par 2x1.5x0.1\par Trauma\par silvadene [TWNoteComboBox1] : Right [TWNoteComboBox4] : Small [de-identified] : Normal [TWNoteComboBox6] : Traumatic [de-identified] : 100% [TWNoteComboBox7] : Blanche [de-identified] : Other [TWNoteComboBox9] : Left [de-identified] : 2 [de-identified] : Moderate [de-identified] : Traumatic [de-identified] : 100% [TWNoteComboBox8] : Other [de-identified] : Left [de-identified] : 2 [de-identified] : Large [de-identified] : Traumatic [de-identified] : 100% [de-identified] : Blanche [de-identified] : Cultures obtained

## 2021-06-24 NOTE — HISTORY OF PRESENT ILLNESS
[FreeTextEntry1] : The etiology is unknown, the initial blister to the left heel she went to the emergency department so treatment of his discharged to presented to emergency department today with worsening of the left heel ulcer and separate identifiable problems in the blood blister at the tip of the third toe and abscess to the medial side of the left foot and a blister on the medial side of the left great toe\par \par THIS FOLLOW UP IS NOT FOR THE ABSCESS, WHICH IS CURRENTLY UNDER GLOBAL

## 2021-06-28 PROBLEM — L02.612 ABSCESS OF LEFT FOOT: Status: ACTIVE | Noted: 2021-01-01

## 2021-06-28 NOTE — REASON FOR VISIT
[_______] : [unfilled] [Foot/Ankle Ulcer] : foot/ankle ulcer [Infection] : infection [Other:___] : [unfilled] [FreeTextEntry2] : both feet

## 2021-06-28 NOTE — PHYSICAL EXAM
[General Appearance - Alert] : alert [General Appearance - In No Acute Distress] : in no acute distress [FreeTextEntry1] : 3rd toe-blood blister [FreeTextEntry2] : 0.5 [FreeTextEntry3] : 0.5 [FreeTextEntry4] : 0.1 [de-identified] : shoe? [FreeTextEntry7] : heel [FreeTextEntry8] : 2 [FreeTextEntry9] : 2 [de-identified] : 0.2 [de-identified] : 50 [de-identified] : medial side of 1st met-abscess [de-identified] : 2 [de-identified] : 2 [de-identified] : 0.1 [de-identified] : 5 [de-identified] : Wound # 4\par Medial side of left GT\par Blister\par 2x1.5x0.1\par Trauma\par silvadene [de-identified] : 50 [TWNoteComboBox1] : Right [TWNoteComboBox4] : Small [TWNoteComboBox6] : Traumatic [de-identified] : Normal [TWNoteComboBox7] : False [de-identified] : 100% [de-identified] : False [TWNoteComboBox9] : Left [de-identified] : 2 [de-identified] : Moderate [de-identified] : Traumatic [de-identified] : 50% [de-identified] : Yes [de-identified] : Left [de-identified] : 2 [de-identified] : Traumatic [de-identified] : Large [de-identified] : 50% [de-identified] : False [de-identified] : Yes [de-identified] : False

## 2021-06-28 NOTE — PROCEDURE
[FreeTextEntry1] : Since there was no significant improvement of the surrounding erythema and cellulitis localized, and noted improvement of the open wounds the patient was referred to the emergency department for admission. I've discussed the case with the triage RN as well as the admitting hospitalist

## 2021-06-28 NOTE — HISTORY OF PRESENT ILLNESS
[FreeTextEntry1] : \par Patient presents for followup of bilateral ulcerations. He is seen after undergoing drainage of an abscess to the medial side of the left foot, with aspiration of a large posterior to the medial side of the left great toe and a large blister of the left heel. He also has an open laceration to the tip of the third toe. Prior culture reports showed no predominating organism and he was placed on Keflex 3 days ago and he presents today for follow up. Initial evaluation showed ongoing cellulitis to the medial and dorsal aspect of the right and worsening of the ulcers listed above

## 2021-06-28 NOTE — HISTORY OF PRESENT ILLNESS
[FreeTextEntry1] : Mr. 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 Laryngoscopy was performed (5/7/19), which revealed a mucosal lesion in the pharynx and a possible left tongue mass. He is an active smoker (1 ppd x 50 years) and has a history alcohol abuse with sobriety for 5 years. Initial FNA of left neck mass in May was inconclusive and revealed suspicious findings for squamous cell carcinoma. PET/CT (5/23/19) demonstrated metastatic cancer of the base of the tongue measuring 2.1 cm (SUV 13.96) with bilateral cervical adenopathy (largest on the right 2.5 cm and on the left 1.3 cm). Repeat biopsy on 6/24/19 revealed extensive squamous cell carcinoma in site with focal invasion, p16+. 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 \par \par He underwent CT simulation for radiation planning on 7/23/19. Due to many concerns regarding treatment plan with concurrent chemotherapy, he requested to postpone his treatment start date.  He decided to start radiation therapy on 9/3/2019  and refused chemotherapy at that time.  He tolerated his treatment with the expected side effects but terminated his treatment against medical advise after 15 fractions. He reports the dysphagia and odynophagia was too severe to continue and refused all recommended medications. He underwent a follow-up CT Neck (11/25/2019) which demonstrated marked improvement at the tongue base and a decrease in size in the neck adenopathy. CT Head was negative for metastatic disease. At his follow-up in January 2020, he had complaints of increasing ear pain and left-sided neck pain x 2 weeks but still did not want to pursue palliative radiation therapy. \par \par A PET CT was performed on 3/20/20 and demonstrated an interval resolution of the majority of previously seen left cervical lymph nodes with the exception of the necrotic level III lymph node that measured 2.1 x 1.7 cm.  There was improved but persistent focal uptake in the asymmetrically enlarged left base of tongue.  Mr. Camacho began noticing worsening dysphagia and odynophagia, as well as a growth along his left neck.  He was evaluated by Dr. Kaur and was not thought to be an appropriate surgical candidate. He started immunotherapy with Keytruda in April 2020 under Dr. Velasquez and completed 7 cycles. On 8/6/20, ? demonstrated progression of disease and he was then started on carbo/taxol weekly.  \par \par F/U PET/CT (11/2020) resolution of previously seen oropharyngeal mass and left cervical lymphadenopathy - continued surveillance recommended. Recent F/U PET/CT (4/13/21) recurrent left base of tongue cancer, recurrent left level 3 bridgette disease, and FDG avid thickening of the junction of the gastric fundus - possibly primary malignancy. Several hypermetabolic peritoneal implants, suspicious for metastases. The plan of care was changed to Taxotere every 3 weeks, which he began on 5/18/21 and is s/p cycle 2 on 6/1/21. \par \par He is experiencing pain and dysphagia at this time. Mr. Camacho is being referred for a re-evaluation for palliative radiation therapy to the neck.

## 2021-06-28 NOTE — DISEASE MANAGEMENT
[Clinical] : TNM Stage: c [II] : II [TTNM] : 2 [NTNM] : 2 [MTNM] : 0 [de-identified] : oropharyngeal

## 2021-07-20 NOTE — REVIEW OF SYSTEMS
[Skin Wound] : skin wound [Fever] : no fever [Chills] : no chills [Leg Claudication] : no intermittent leg claudication [Lower Ext Edema] : no extremity edema [Limb Pain] : no limb pain [Limb Swelling] : no limb swelling

## 2021-07-20 NOTE — PHYSICAL EXAM
[General Appearance - Alert] : alert [General Appearance - In No Acute Distress] : in no acute distress [1+] : left foot dorsalis pedis 1+ [Vibration Dec.] : diminished vibratory sensation at the level of the toes [Position Sense Dec.] : diminished position sense at the level of the toes [Diminished Throughout Right Foot] : diminished sensation with monofilament testing throughout right foot [Diminished Throughout Left Foot] : diminished sensation with monofilament testing throughout left foot [de-identified] : overall muscle strength testing is decreased, but consistant with the patients age and medical problems. Mild atrophy and overall weakness present.\par  [FreeTextEntry1] : medial 1st met [FreeTextEntry2] : 2.0 [FreeTextEntry4] : 0.2 [FreeTextEntry3] : 2.0 [de-identified] : DM [de-identified] : 50 [de-identified] : bactroban [FreeTextEntry7] : great toe medially [de-identified] : unstageable [FreeTextEntry8] : 1.5 [FreeTextEntry9] : 1.5 [de-identified] : 0.1 [de-identified] : DM [de-identified] : bestadine [de-identified] : Wound number for this in the tip of the right third toe and measures 1 cm x 0.5 cm x 0.2 cm with a small amount of purulent drainage there is no tunneling or sinus tract no probe to bone no periwound erythema no cellulitis. There were no clinical signs of systemic infection at this time. The patient underwent a sharp full-thickness excisional debridement into the subcutaneous tissue. Tissue was taken for anaerobic Gram stain culture and sensitivity. Notified of the results. [de-identified] : heel [de-identified] : 5 [de-identified] : 6.5 [de-identified] : 0.1 [de-identified] : DM [de-identified] : bactroban [TWNoteComboBox1] : Left [TWNoteComboBox2] : 3 [TWNoteComboBox4] : Small [TWNoteComboBox6] : Arterial [de-identified] : Erythema [de-identified] : None [de-identified] : 50% [de-identified] : None [de-identified] : Yes [TWNoteComboBox7] : Blanche [de-identified] : Debridement excisional [de-identified] : Daily [TWNoteComboBox9] : Left [de-identified] : None [de-identified] : Arterial [de-identified] : Erythema [de-identified] : 100% [de-identified] : Daily [de-identified] : 2 [de-identified] : Left [de-identified] : Moderate [de-identified] : Arterial [de-identified] : 100% [de-identified] : Daily

## 2021-07-20 NOTE — REASON FOR VISIT
[Follow-Up Visit] : a follow-up visit for [Foot/Ankle Ulcer] : foot/ankle ulcer [FreeTextEntry2] : bilateral

## 2021-07-20 NOTE — PROCEDURE
[FreeTextEntry1] : A lengthy discussion today regarding the fact that the patient's left lower extremity phalangectomy. He needs to take account for his medical noncompliance and face consequences should he not heal and anticipate he will. He has had infectious disease, vascular surgery, and we will specialist at taking care of him. I signed himself out at the nursing home AGAINST MEDICAL ADVICE he was discharged with a followup appointment with vascular surgery for the appropriate antibiotics and without the appropriate wound care orders and visiting nurse scheduling. The patient underwent a sharp full-thickness excision and debridement of the subcutaneous tissue of the right third toe and the medial aspect of the left foot. There is no bleeding was controlled with pressure Bactroban to be applied to the left except the medial side of the left great toe with a Betadine daily he last saw Bactroban applied to the tip of the third toe daily. I advanced my  to schedule a visiting nurse services as soon as possible however if they cannot get him within 24-48 hours he is to return to the office for followup wound care

## 2021-07-20 NOTE — HISTORY OF PRESENT ILLNESS
[FreeTextEntry1] : Patient presents for followup of bilateral ulcerations. He is seen after undergoing in office drainage of an abscess to the medial side of the left foot, with aspiration of a large blister to the medial side of the left great toe and a large blister of the left heel. He also has an open laceration to the tip of the third toe. \par Currently hospitalized, had daily wound care, was seen by vascular surgery for an angiogram. It was determined at this time that the patient would need the extremity bypass surgery. Patient underwent successful lower extremity bypass surgery and was discharged to a skilled nursing facility. The patient tells me that he signed himself out AGAINST MEDICAL ADVICE this past Friday, July 16. He signed himself out AGAINST MEDICAL ADVICE and not receiving proper discharge, discharge orders, follow up appointment, and wound care the patient is a dressing bandage on his foot for further days. He presents today for followup. He states he never received a discharge antibiotics and instructions were visiting nurse appointments and I advised the patient that when he signed himself out AGAINST MEDICAL ADVICE and leave a proper discharge this in the consequences.In addition, he is not taking any oral antibiotics and he did not know that he is supposed to since he never allowed proper discharge

## 2021-07-22 PROBLEM — C10.9: Status: ACTIVE | Noted: 2019-05-20

## 2021-07-22 PROBLEM — L97.512 CHRONIC FOOT ULCER, RIGHT, WITH FAT LAYER EXPOSED: Status: ACTIVE | Noted: 2020-03-26

## 2021-07-22 NOTE — PHYSICAL EXAM
[General Appearance - Alert] : alert [General Appearance - In No Acute Distress] : in no acute distress [1+] : left foot dorsalis pedis 1+ [Vibration Dec.] : diminished vibratory sensation at the level of the toes [Position Sense Dec.] : diminished position sense at the level of the toes [Diminished Throughout Right Foot] : diminished sensation with monofilament testing throughout right foot [Diminished Throughout Left Foot] : diminished sensation with monofilament testing throughout left foot [de-identified] : overall muscle strength testing is decreased, but consistant with the patients age and medical problems. Mild atrophy and overall weakness present.\par  [FreeTextEntry1] : medial 1st met [FreeTextEntry2] : 2.0 [FreeTextEntry3] : 2.0 [FreeTextEntry4] : 0.2 [de-identified] : DM [de-identified] : 50 [de-identified] : bactroban [FreeTextEntry7] : great toe medially [de-identified] : unstageable [FreeTextEntry9] : 1.5 [FreeTextEntry8] : 1.5 [de-identified] : 0.1 [de-identified] : DM [de-identified] : bestadine [de-identified] : Wound number for this in the tip of the right third toe shows negative culture-wound now superficial LTB of skin, no cellulitis, no tunneling, no sinus tract, no drainage, no odor, mild periwound erythema, no evidence of infection\par  [de-identified] : heel [de-identified] : 5 [de-identified] : 6.5 [de-identified] : 0.1 [de-identified] : DM [de-identified] : bactroban [TWNoteComboBox1] : Left [TWNoteComboBox2] : 3 [TWNoteComboBox4] : Small [TWNoteComboBox6] : Arterial [de-identified] : Erythema [de-identified] : None [de-identified] : 50% [de-identified] : None [de-identified] : Yes [TWNoteComboBox7] : Blanche [de-identified] : Debridement excisional [de-identified] : Daily [TWNoteComboBox9] : Left [de-identified] : None [de-identified] : Arterial [de-identified] : Erythema [de-identified] : 100% [de-identified] : Daily [de-identified] : Left [de-identified] : 2 [de-identified] : Moderate [de-identified] : Arterial [de-identified] : 100% [de-identified] : Daily

## 2021-07-22 NOTE — PROCEDURE
[FreeTextEntry1] : Awaiting VNS \par Wound orders printed, supplies dispensed. VNS to start  tomorrow.\par follow up appt 2 weeks\par

## 2021-07-27 NOTE — HISTORY OF PRESENT ILLNESS
[FreeTextEntry1] : Mr. Camacho is present for OTV today. He is status post fraction 2 / 5 of palliative radiation to the oropharynx/neck. Patient states he is extremely fatigued today and in significant pain. Mr Camacho states he is experiencing left facial pain with a pain score of 10/10 and he is currently taking Tylenol without relief. He also noted to be productively coughing and he stated the sputum is blood-tinged at times.  Patient has erythema in the area of left neck being treated and was given Aquaphor to be used post-treatment and again at bedtime. Plan to continue RT as prescribed. \par \par Patient is currently prescribed gabapentin but notes pain as an 11/ 10 so we will also add a fentanyl patch.  Magic mouth wash was contraindicated due to his diabetes but will also add baking soda rinses. \par

## 2021-07-27 NOTE — VITALS
[Maximal Pain Intensity: 10/10] : 10/10 [Least Pain Intensity: 4/10] : 4/10 [70: Cares for self; unalbe to carry on normal activity or do active work.] : 70: Cares for self; unable to carry on normal activity or do active work.

## 2021-07-27 NOTE — REVIEW OF SYSTEMS
[Dysphagia: Grade 2 - Symptomatic and altered eating/swallowing] : Dysphagia: Grade 2 - Symptomatic and altered eating/swallowing [Nausea: Grade 0] : Nausea: Grade 0 [Fatigue: Grade 1 - Fatigue relieved by rest] : Fatigue: Grade 1 - Fatigue relieved by rest [Neck Edema: Grade 2 - Moderate neck edema; slight obliteration of anatomic landmarks; limiting instrumental ADL] : Neck Edema: Grade 2 - Moderate neck edema; slight obliteration of anatomic landmarks; limiting instrumental ADL [Mucositis Oral: Grade 2 - Moderate pain; not interfering with oral intake; modified diet indicated] : Mucositis Oral: Grade 2 - Moderate pain; not interfering with oral intake; modified diet indicated [Xerostomia: Grade 2 - Moderate symptoms; oral intake alterations (e.g., copious water, other lubricants, diet limited to purees and/or soft, moist foods); unstimulated saliva 0.1 to 0.2 ml/min] : Xerostomia: Grade 2 - Moderate symptoms; oral intake alterations (e.g., copious water, other lubricants, diet limited to purees and/or soft, moist foods); unstimulated saliva 0.1 to 0.2 ml/min [Oral Pain: Grade 2 - Moderate pain; limiting instrumental ADL] : Oral Pain: Grade 2 - Moderate pain; limiting instrumental ADL [Cough: Grade 1 - Mild symptoms; nonprescription intervention indicated] : Cough: Grade 1 - Mild symptoms; nonprescription intervention indicated [Voice Alteration: Grade 2 - Moderate or persistent change from normal voice; still understandable] : Voice Alteration: Grade 2 - Moderate or persistent change from normal voice; still understandable [Skin Hyperpigmentation: Grade 1 - Hyperpigmentation covering <10% BSA; no psychosocial impact] : Skin Hyperpigmentation: Grade 1 - Hyperpigmentation covering <10% BSA; no psychosocial impact [Dermatitis Radiation: Grade 2 - Moderate to brisk erythema; patchy moist desquamation, mostly confined to skin folds and creases; moderate edema] : Dermatitis Radiation: Grade 2 - Moderate to brisk erythema; patchy moist desquamation, mostly confined to skin folds and creases; moderate edema [FreeTextEntry1] : productive cough with blood tinged sputum

## 2021-07-28 PROBLEM — I73.9 PERIPHERAL ARTERIAL DISEASE WITH HISTORY OF REVASCULARIZATION: Status: ACTIVE | Noted: 2021-01-01

## 2021-07-28 NOTE — PHYSICAL EXAM
[Normal Breath Sounds] : Normal breath sounds [Normal Rate and Rhythm] : normal rate and rhythm [Ankle Swelling (On Exam)] : present [Ankle Swelling On The Left] : of the left ankle [Ankle Swelling On The Right] : mild [Alert] : alert [Oriented to Person] : oriented to person [Oriented to Place] : oriented to place [Oriented to Time] : oriented to time [de-identified] : Awake and Alert [de-identified] : large mass at the base of the neck left side [FreeTextEntry1] : biphasic pt and dp signals left [de-identified] : left groin incision with superficial breakdown - every other staple removed, knee incision c/d/i, foot healing -medial wounds almost healed, heel improving [de-identified] : A [de-identified] : Appropriate

## 2021-07-28 NOTE — REVIEW OF SYSTEMS
[Fever] : no fever [Chills] : no chills [Lower Ext Edema] : lower extremity edema [Limb Pain] : no limb pain [Limb Swelling] : limb swelling [As Noted in HPI] : as noted in HPI

## 2021-07-28 NOTE — REASON FOR VISIT
[de-identified] : s/p left fem pop bypass for nonhealing ulcerations of the left foot [de-identified] : 66 yo male s/p a left fem pop bypass with PTFE for nonhealing ulcerations of the left foot returns in follow up. He reports that he is doing well. He reports that his foot is healing. He is undergoing VNS. He is also undergoing treatment for his head and neck cancer.

## 2021-07-28 NOTE — DISCUSSION/SUMMARY
[FreeTextEntry1] : 66 yo male s/p left fem pop bypass for nonhealing ulceration of the left foot. The patient's bypass graft is clinically patent. His foot is healing. He has mild incisional breakdown of the groin. he was given careful instructions on groin care. A note was also given to him for VNS.\par \par Follow up in 1 week\par Groin care as per our discussion and VNS note

## 2021-08-04 NOTE — PHYSICAL EXAM
[Normal Breath Sounds] : Normal breath sounds [Normal Rate and Rhythm] : normal rate and rhythm [Ankle Swelling (On Exam)] : not present [Ankle Swelling On The Left] : of the left ankle [Alert] : alert [Oriented to Person] : oriented to person [Oriented to Place] : oriented to place [Oriented to Time] : oriented to time [de-identified] : Awake and Alert [de-identified] : large mass at the base of the neck left side [FreeTextEntry1] : biphasic pt signal left [de-identified] : left groin incision healed - staples removed, foot healing -medial wounds almost healed, heel granulating [de-identified] : Appropriate

## 2021-08-04 NOTE — REVIEW OF SYSTEMS
[Fever] : no fever [Chills] : no chills [Lower Ext Edema] : lower extremity edema [Limb Pain] : no limb pain [Limb Swelling] : no limb swelling [As Noted in HPI] : as noted in HPI

## 2021-08-04 NOTE — DISCUSSION/SUMMARY
[FreeTextEntry1] : 66 yo male s/p left fem pop bypass for nonhealing ulceration of the left foot. The patient's bypass graft is clinically patent. His foot is healing. He groin incision is improved and the staples were removed. \par \par He will continue local wound care with VNS as per Dr. Carrington.\par \par He will follow up in 1 month for a post operative arterial duplex.

## 2021-08-04 NOTE — REASON FOR VISIT
[de-identified] : s/p left fem pop bypass for nonhealing ulcerations of the left foot [de-identified] : 66 yo male s/p a left fem pop bypass with PTFE for nonhealing ulcerations of the left foot returns in follow up. He reports that he is doing well. He reports that his continues to improve. He is undergoing VNS. He is also undergoing treatment for his head and neck cancer.

## 2021-08-05 PROBLEM — L97.522 CHRONIC FOOT ULCER WITH FAT LAYER EXPOSED, LEFT: Status: ACTIVE | Noted: 2019-07-08

## 2021-08-05 PROBLEM — L97.511 CHRONIC FOOT ULCER, LIMITED TO BREAKDOWN OF SKIN, RIGHT: Status: ACTIVE | Noted: 2019-04-09

## 2021-08-05 NOTE — PHYSICAL EXAM
[General Appearance - Alert] : alert [General Appearance - In No Acute Distress] : in no acute distress [1+] : left foot dorsalis pedis 1+ [Vibration Dec.] : diminished vibratory sensation at the level of the toes [Position Sense Dec.] : diminished position sense at the level of the toes [Diminished Throughout Right Foot] : diminished sensation with monofilament testing throughout right foot [Diminished Throughout Left Foot] : diminished sensation with monofilament testing throughout left foot [de-identified] : overall muscle strength testing is decreased, but consistant with the patients age and medical problems. Mild atrophy and overall weakness present.\par  [FreeTextEntry1] : medial 1st met [FreeTextEntry2] : 0.3 [FreeTextEntry3] : 1.0 [FreeTextEntry4] : 0.1 [de-identified] : DM [de-identified] : 100 [de-identified] : bactroban [FreeTextEntry7] : great toe medially--healed [de-identified] : Wound number for this in the tip of the right third toe --healed [de-identified] : heel [de-identified] : 5 [de-identified] : 5 [de-identified] : 0.1 [de-identified] : DM [de-identified] : 50 [de-identified] : bactroban [TWNoteComboBox1] : Left [TWNoteComboBox2] : 3 [TWNoteComboBox4] : Small [TWNoteComboBox6] : Arterial [de-identified] : Erythema [de-identified] : None [de-identified] : None [de-identified] : None [de-identified] : Yes [TWNoteComboBox7] : Blanche [de-identified] : Debridement excisional [de-identified] : Daily [TWNoteComboBox9] : Left [de-identified] : False [de-identified] : False [de-identified] : False [de-identified] : False [de-identified] : False [de-identified] : Left [de-identified] : 2 [de-identified] : Moderate [de-identified] : Arterial [de-identified] : 50% [de-identified] : Yes [de-identified] : Daily

## 2021-08-05 NOTE — PROCEDURE
[FreeTextEntry1] : left medial foot\par a sharp full thickness excisional debridement utilizing a scalpel tissue nipper as well as a curette into the subcutaneous tissue level was performed. Bleeding was mild and was controlled with pressure. , discharge orders as well as wound care orders were reviewed with the patient and a follow up appointment given, Bactroban to be applied on a daily basis\par \par Wound orders printed\par follow up appt 2 weeks\par

## 2021-08-19 NOTE — PHYSICAL EXAM
[General Appearance - Alert] : alert [General Appearance - In No Acute Distress] : in no acute distress [1+] : left foot dorsalis pedis 1+ [Vibration Dec.] : diminished vibratory sensation at the level of the toes [Position Sense Dec.] : diminished position sense at the level of the toes [Diminished Throughout Right Foot] : diminished sensation with monofilament testing throughout right foot [Diminished Throughout Left Foot] : diminished sensation with monofilament testing throughout left foot [de-identified] : overall muscle strength testing is decreased, but consistant with the patients age and medical problems. Mild atrophy and overall weakness present.\par  [FreeTextEntry1] : medial 1st met--healed [FreeTextEntry7] : great toe medially--healed [de-identified] : Wound number for this in the tip of the right third toe --healed [de-identified] : heel [de-identified] : 4 [de-identified] : 4 [de-identified] : 0.1 [de-identified] : iodosorb [de-identified] : DM [TWNoteComboBox1] : Left [TWNoteComboBox2] : 3 [TWNoteComboBox4] : False [TWNoteComboBox6] : False [de-identified] : False [de-identified] : False [de-identified] : False [de-identified] : False [de-identified] : False [TWNoteComboBox7] : False [de-identified] : False [de-identified] : False [TWNoteComboBox9] : Left [de-identified] : Left [de-identified] : 2 [de-identified] : Moderate [de-identified] : Arterial [de-identified] : 100% [de-identified] : No [de-identified] : Every other day

## 2021-08-19 NOTE — PROCEDURE
[FreeTextEntry1] : Iodosorb lewft heel DSD\par I have reviewed the care orders with the patient they understand and they also understands the long-term consequences of not following my wound care orders as well  precautions I have recommended  and shoe gear advice I have mentioned.\par follow up appt 1 week\par

## 2021-08-26 NOTE — HISTORY OF PRESENT ILLNESS
[FreeTextEntry1] : Patient presents for followup of bilateral ulcerations. \par Continues to walk on the LLE AMA

## 2021-08-26 NOTE — PHYSICAL EXAM
[General Appearance - Alert] : alert [General Appearance - In No Acute Distress] : in no acute distress [1+] : left foot dorsalis pedis 1+ [Vibration Dec.] : diminished vibratory sensation at the level of the toes [Position Sense Dec.] : diminished position sense at the level of the toes [Diminished Throughout Right Foot] : diminished sensation with monofilament testing throughout right foot [Diminished Throughout Left Foot] : diminished sensation with monofilament testing throughout left foot [de-identified] : overall muscle strength testing is decreased, but consistant with the patients age and medical problems. Mild atrophy and overall weakness present.\par  [FreeTextEntry1] : medial 1st met--healed [de-identified] : heel [FreeTextEntry7] : great toe medially--healed [de-identified] : 5 [de-identified] : 4.5 [de-identified] : 0.1 [de-identified] : DM [de-identified] : iodosorb [TWNoteComboBox1] : Left [TWNoteComboBox2] : 3 [TWNoteComboBox4] : False [TWNoteComboBox6] : False [de-identified] : False [de-identified] : False [de-identified] : False [de-identified] : False [de-identified] : False [TWNoteComboBox7] : False [de-identified] : False [de-identified] : False [TWNoteComboBox9] : Left [de-identified] : Left [de-identified] : 2 [de-identified] : Small [de-identified] : Arterial [de-identified] : 100% [de-identified] : No [de-identified] : Every other day

## 2021-09-09 PROBLEM — L89.622 DECUBITUS ULCER OF LEFT HEEL, STAGE 2: Status: ACTIVE | Noted: 2021-01-01

## 2021-09-09 PROBLEM — E11.621 CONTROLLED TYPE 2 DIABETES MELLITUS WITH FOOT ULCER, WITH LONG-TERM CURRENT USE OF INSULIN: Status: ACTIVE | Noted: 2019-12-05

## 2021-09-09 PROBLEM — E11.42 CONTROLLED TYPE 2 DIABETES MELLITUS WITH DIABETIC POLYNEUROPATHY, WITH LONG-TERM CURRENT USE OF INSULIN: Status: ACTIVE | Noted: 2018-11-28

## 2021-09-09 PROBLEM — E11.51 CONTROLLED TYPE 2 DIABETES MELLITUS WITH DIABETIC PERIPHERAL ANGIOPATHY WITHOUT GANGRENE, WITH LONG-TERM CURRENT USE OF INSULIN: Status: ACTIVE | Noted: 2018-11-28

## 2021-09-09 NOTE — HISTORY OF PRESENT ILLNESS
[FreeTextEntry1] : follow up left heel ulcer Upon removing the dressing one immediately notes a white power-like material on the wound it is  obvious that the SNF  is not following the wound care orders that have been sent back to them\par

## 2021-09-09 NOTE — PROCEDURE
[FreeTextEntry1] : I have had a lengthy discussion with the patient regarding overall skincare. The importance of the type of socks, the type of shoes, and the type of overall foot hygiene is important to help control and prevent eruptions especially over extreme weather changes. This included but was not limited to hydration and lubrication, dove soap, triple rinse clothing and linens. I also explained the importance of thorough drying of both feet especially the web spaces. Given the extreme temperatures back in a car I also reviewed the type of shoes that would help reduce the chances of cracking of the skin especially leading to fissuring of the heels. Overall skincare precautions were reviewed education literature dispensed in the patient's questions asked and answered appropriately.\par A complete and thorough evaluation of the type of shoes they should be wearing and type of shoes for this time of year was discussed with patient. No shoes left-non slip socks only\par

## 2021-09-09 NOTE — PHYSICAL EXAM
[General Appearance - Alert] : alert [General Appearance - In No Acute Distress] : in no acute distress [1+] : left foot dorsalis pedis 1+ [Vibration Dec.] : diminished vibratory sensation at the level of the toes [Position Sense Dec.] : diminished position sense at the level of the toes [Diminished Throughout Right Foot] : diminished sensation with monofilament testing throughout right foot [Diminished Throughout Left Foot] : diminished sensation with monofilament testing throughout left foot [de-identified] : overall muscle strength testing is decreased, but consistant with the patients age and medical problems. Mild atrophy and overall weakness present.\par  [FreeTextEntry1] : medial 1st met--healed [de-identified] : skincare [FreeTextEntry7] : great toe medially--healed [de-identified] : skincare [de-identified] : heel [de-identified] : 4 [de-identified] : 4 [de-identified] : 0.1 [de-identified] : DM [de-identified] : iodosorb has been applied today [de-identified] : I have LM w/ SNF to discuss [TWNoteComboBox1] : Left [TWNoteComboBox2] : False [TWNoteComboBox9] : Left [de-identified] : Left [de-identified] : 2 [de-identified] : Small [de-identified] : Arterial [de-identified] : 100% [de-identified] : No [de-identified] : False

## 2021-09-09 NOTE — REASON FOR VISIT
[Follow-Up Visit] : a follow-up visit for [Foot/Ankle Ulcer] : foot/ankle ulcer [FreeTextEntry2] : left heel

## 2021-09-28 PROBLEM — C10.9 OROPHARYNX CANCER: Status: ACTIVE | Noted: 2019-05-20

## 2021-09-28 PROBLEM — C77.9 METASTATIC ADENOCARCINOMA TO LYMPH NODE: Status: ACTIVE | Noted: 2020-08-17

## 2021-09-28 PROBLEM — M54.2 PAIN, NECK: Status: ACTIVE | Noted: 2020-04-23

## 2021-09-28 PROBLEM — G89.3 PAIN OF METASTATIC MALIGNANCY: Status: ACTIVE | Noted: 2020-04-01

## 2021-09-28 NOTE — PHYSICAL EXAM
[Fully active, able to carry on all pre-disease performance without restriction] : Status 0 - Fully active, able to carry on all pre-disease performance without restriction [Normal] : affect appropriate [de-identified] : left posterior palate - induration together with left tonsillar infiltration, Left cervical adenopathy; improving [de-identified] : BLE edema R>L, improving [de-identified] : left neck base mass significantly decreased in size with red with less erythema, less cystic in appearance, less firm, no pain to palpation, no drainage noted

## 2021-09-28 NOTE — CONSULT LETTER
[Dear  ___] : Dear  [unfilled], [Consult Letter:] : I had the pleasure of evaluating your patient, [unfilled]. [Please see my note below.] : Please see my note below. [Consult Closing:] : Thank you very much for allowing me to participate in the care of this patient.  If you have any questions, please do not hesitate to contact me. [Sincerely,] : Sincerely, [DrEvie  ___] : Dr. CHOWDARY [DrEvie ___] : Dr. CHOWDARY [FreeTextEntry3] : Ana Velasquez MD\par [facility] [place] [facility]\par

## 2021-09-28 NOTE — HISTORY OF PRESENT ILLNESS
[de-identified] : 63 year old male presents today for new diagnosis of head and neck cancer, referred by Dr. Kaur, ENT.  Mr. Camacho was sent to ENT for a left neck mass with associated left tongue base.  He reported a 3 month history of constant, moderate progressive left sided throat pain with radiating otalgia.  Incidentally it was noted on neck US with vascular surgeon to have a left neck mass.  He has a long standing history of cigarette smoking 5- 6 cigarettes per day and uses alcohol in past however has quit x 5 years.  CT scan done 5/16/19 showed a 5.5cm multiloculated solid and cystic mass within the left latera; neck soft tissues.  Findings may reflect primary neoplasm versus conglomerate of left level 3 cystic/necrotic lymphadenopathy.  Additional abnormal left level 2-4 lymphadenopathy.  Nonspecific prominence of the left lingual tonsils with enhancing soft tissue involving the left oropharyngeal lateral wall.  Regional effacement of fat planes laterally involving the left submandibular gland and posteriorly involving the carotid sheath.  Mild asymmetric enlargement of the left submandibular gland.  PET CT is recommend.  He has not had a biopsy.  He has complaints of a sore throat, with left ear pain at times and new onset of headaches.  \par \par Patient was adopted so he does not know his family history [de-identified] : Patient presents today for follow up of head and neck cancer. Pt is at [facility]- has had significant weight loss and increased secretions

## 2021-09-28 NOTE — ASSESSMENT
[FreeTextEntry1] : 66 yo male referred by Dr. 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 Patient has palpable left cervical adenopathy. \par \par Patient offered chemo/ RT - agreed for RT only, received 3000 cGy of 7000 cGY, stopped the treatment b/o poor tolerance in October 2019. \par \par  Presented in April 2020 with POD -Left side of the neck - induration at the base of the neck \par Tumor p16 positive\par  Foudation One- PDL-1 1- ( positive), MS - stable, TMB- 4,  PIK3CA amplification ( alpelisib), CCND1 amplification ( abemaciclib) \par  Patient evaluated by Dr. YING Ureña - not a candidate for surgery\par - Refered to Dr. Thapa for evaluation for palliative RT - declined \par - restaging PETCT 3/2020 reviewed- locally advanced disease, with increased pain\par - Pembrolizumab - cycle 2 4/23/2020, cycle 3- 5/14/2020, cycle 4 6/4/2020, cycle 6 /16/2020, 7/16/2020\par - BP decreased - patient on Losartan/ Metoprolol, off meds - with hyperkalemia, hyponatremia \par \par 8/6/2020- progression of the disease, hyperglycemia\par - 8/13/2020 - week 1  carbo/ taxol weekly.  SIde effects explained to patient \par - pain controlled, weight stable, increased left base of the neck adenopathy, with impending ulceration, offered RT again, declined.  Explained the POD with risk of ulceration from rapidly growing cancer. \par - when diabetes under control will rx PIQRAY\par 8/27/2020 blood pressure (100/54) and hgb (8.5) downtrending possibly due to slow bleed, WBC 2.2 with manual ANC 1.1; patient refusing treatment as fearful of counts dropping further.  patient refusing blood transfusion at this time, but agreeable to receiving IV fluids.  BUN and creatinine increasing with hyperkalemia (5.7)\par patient told recurrent mass +/- drainage likely due to progression of disease, he continues to refuse radiation treatments\par 8/31/2020 treatment held, patient sent to ED for hyperkalemia 6.0 (last 5.7 8/27/2020), hgb 8.9, WBC 3.2, CRP increasing now 17.4\par 9/10/2020 taxo/carbo weekly; referral to see Dr. Pina for ongoing hyperkalemia and worsening renal function\par 9/17/2020 taxo/carbo #4 continues, left neck mass responding to treatment, patient needs to see Dr. Pina renal function remains compromised and hyperkalemia (5.8) continues\par 9/24/2020 treatment held due to cardiac symptoms, troponin checked (27) has been elevated previously, Dr Olson notified of symptoms and patient told to call his office should the angina worsen.  Weakness may be cardiac related, blood pressure also slightly lower than normal; need to follow up with Dr. Pina\par 10/9 /2020  Feels better, decreased mass at the base of the neck.  Stable PO Intake.  Undergoing cardiology evaluation.  Hyperkalemia- repeat testing today. carbo/ taxol weekly day 1 today. \par 10/15/2020- Taxol/ carbo cycle 3 - total dose Taxol x 6\par 11/5/2020 - cycle 4 - carbo/ taxol day 1, 8.  Developing neuropathy- will lower the dose and complete after cycle 4 restaging PETCT  \par 11/12/2020- c/o cough, increased at night, RX PPI last week, he is unclear if picked up.  \par INcreased neuropathy in hands, feet, - hold chemo today\par Hg 8.9 - monitor \par \par 11/19/2020\par PETCT with resolution of malignancy/ adenopathy- hold chemo for now\par - GI / Surgery/ cardio evaluation for cholecystectomy. \par  Neuropathy - on gabapentin \par GERD and reflux - start PPI \par \par 12/23/2020\par s/p cholecystectomy\par Hg 10.5 - better\par Still c/o neuropathy - dropping objects, declined OT\par PETCT - Nov 14, 2020- stable \par RTC 6-8 weeks\par Labs- TSH, CEA, \par \par 2/24/2021\par patient completed treatment\par due for PET restaging in March 2021\par s/p stent placement 2-3 weeks ago [facility]- \par has mild chest discomfort at times- no SOB vitals stable- refuses ER visit- called yesterday by [facility] staff was told normal post procedure- add troponins\par needs follow up with Dr. Olson- attempted to secure apt with him this week- will report to ER with increasing chest pain- tightness and or dyspnea\par \par 4/29/2021\par PETCT April 13, 2021- recurrence of disease- left base of the tongue, LN, FDG - gastric area, peritoneal  mets \par \par Plan \par - mediport\par - egd with GI \par - will plan for Taxotere q 3 weeks\par \par 5/11/2021\par taxotere 75mg/m2 today q 3 weeks tx plan and schema reviewed pt verbalized understanding \par EGD- 5/6/2021-.  Duodenum, polyp, biopsy:\par 	Tubular adenoma involving duodenal mucosa\par 	Duodenal mucosa also shows mild chronic active duodenitis and gastric foveolar\par     metaplasia.\par 	Multiple levels were examined.\par B.  Stomach, antrum, biopsy:\par 	Gastric mucosa, antral type, showing mild chronic inactive gastritis.\par 	No Helicobacter-like organisms identified on Diff Quik stain.\par C.  Stomach, body, biopsy:\par 	Gastric mucosa, oxyntic type, showing parietal cell changes suggestive of PPI effect.\par 	No Helicobacter-like organisms identified on Diff Quik stain.\par D.  Stomach, fundus, biopsy:\par 	Gastric mucosa, oxyntic type, showing parietal cell changes suggestive of PPI effect\par     and few ectatic glands, consistent with fundic gland polyp.\par 	No Helicobacter-like organisms identified on Diff Quik stain.\par E.  Gastroesophageal junction, biopsy:\par 	Scant gastric cardia-type mucosa showing mild chronic inflammation.\par 	Associated esophageal squamous mucosa showing no significant histopathologic\par     changes.	\par No intestinal metaplasia is identified.\par severe pain- IV Dilaudid today 0.5mg - start tramadol prn at home-I stop checked\par  \par \par 9/28/2021\par pt s/p discharge to [facility] at end of Aug for increasing weakness, dehydration, hyponatremia.  He declined a PEG at the time and was leaning towards palliative care-\par currently he is weak has increased secretions and is down to 156 lbs from 212 in July- discussed golas of care with him and called his sister on the phone- he is agreeable with a PEG tube at this time\par needs CT neck, chest abd and pelvis\par continues to follow with Dr. Carrington for wound mgmt \par return in 1 month after above\par \par \par case and mgmt discussed with Dr. Velasquez-

## 2021-09-29 NOTE — DISEASE MANAGEMENT
[Clinical] : TNM Stage: c [TTNM] : 2 [NTNM] : 2 [MTNM] : 0 [II] : II [de-identified] : 2000 cGy [de-identified] : 2000 cGy [de-identified] : oropharyngeal - completed 7/30/2021

## 2021-09-29 NOTE — HISTORY OF PRESENT ILLNESS
[FreeTextEntry1] : Mr. 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 Laryngoscopy was performed (5/7/19), which revealed a mucosal lesion in the pharynx and a possible left tongue mass. He is an active smoker (1 ppd x 50 years) and has a history alcohol abuse with sobriety for 5 years. Initial FNA of left neck mass in May was inconclusive and revealed suspicious findings for squamous cell carcinoma. PET/CT (5/23/19) demonstrated metastatic cancer of the base of the tongue measuring 2.1 cm (SUV 13.96) with bilateral cervical adenopathy (largest on the right 2.5 cm and on the left 1.3 cm). Repeat biopsy on 6/24/19 revealed extensive squamous cell carcinoma in site with focal invasion, p16+. 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 \par \par He underwent CT simulation for radiation planning on 7/23/19. Due to many concerns regarding treatment plan with concurrent chemotherapy, he requested to postpone his treatment start date.  He decided to start radiation therapy on 9/3/2019  and refused chemotherapy at that time.  He tolerated his treatment with the expected side effects but terminated his treatment against medical advise after 15 fractions. He reports the dysphagia and odynophagia was too severe to continue and refused all recommended medications. He underwent a follow-up CT Neck (11/25/2019) which demonstrated marked improvement at the tongue base and a decrease in size in the neck adenopathy. CT Head was negative for metastatic disease. At his follow-up in January 2020, he had complaints of increasing ear pain and left-sided neck pain x 2 weeks but still did not want to pursue palliative radiation therapy. \par \par A PET CT was performed on 3/20/20 and demonstrated an interval resolution of the majority of previously seen left cervical lymph nodes with the exception of the necrotic level III lymph node that measured 2.1 x 1.7 cm.  There was improved but persistent focal uptake in the asymmetrically enlarged left base of tongue.  Mr. Camacho began noticing worsening dysphagia and odynophagia, as well as a growth along his left neck.  He was evaluated by Dr. Kaur and was not thought to be an appropriate surgical candidate. He started immunotherapy with Keytruda in April 2020 under Dr. Velasquez and completed 7 cycles. On 8/6/20, ? demonstrated progression of disease and he was then started on carbo/taxol weekly.  \par \par F/U PET/CT (11/2020) resolution of previously seen oropharyngeal mass and left cervical lymphadenopathy - continued surveillance recommended. Recent F/U PET/CT (4/13/21) recurrent left base of tongue cancer, recurrent left level 3 bridgette disease, and FDG avid thickening of the junction of the gastric fundus - possibly primary malignancy. Several hypermetabolic peritoneal implants, suspicious for metastases. The plan of care was changed to Taxotere every 3 weeks, which he began on 5/18/21 and is s/p cycle 2 on 6/1/21. \par \par He received palliative radiation to the oropharynx/neck for palliation to a total dose of 20 Gy in 5 fractions. He completed treatment on 7/30/21 and offered minimal acute side effects.

## 2021-10-26 ENCOUNTER — APPOINTMENT (OUTPATIENT)
Dept: HEMATOLOGY ONCOLOGY | Facility: CLINIC | Age: 66
End: 2021-10-26

## 2024-01-01 NOTE — HISTORY OF PRESENT ILLNESS
(1) minimal assist, teach one side; mother does other, staff holds
(1) minimal assist, teach one side; mother does other, staff holds
[FreeTextEntry1] : Mr. JOEY LONG is a 64 year old male with newly diagnosed tongue cancer in May 2019 T2N2 HPV + sent by Dr Velasquez for out of control diabetes \par Patient offered chemo/ RT - proceeded with RT only, received 3000 cGy of 7000 cGY, stopped the treatment b/o poor tolerance in October 2019. \par has been eating worse lately \par may start on Alpelisib\par type 2 DM for  at least 6  years complicated by     neuropathy       \par on Lantus 22 units. taken off oral diabetes meeds by his last endocrinologist\par took it last night \par \par Last HgA1c 8/2020 10.6\par \par Checks BS none \par \par Microvascular complications:\par Nephropathy denies last Cr at goal WNL \par Neuropathy  symptoms, yes big toe amputation 2014\par microfilament exam \par Retinopathy        last eye exam over a year Dr Italia Chavez [phone] [facility]\par \par Macrovascular complications:\par \par HTN at goal \par CAD/CVA  yes MI sees Dr Wilmer Vaca [facility]\par Lipids  not available   on  Atorvastatin  with  LFT's normal 8/2020 used to be a heavy drinker last 6yrs ago per pt \par \par 
(1) minimal assist, teach one side; mother does other, staff holds